# Patient Record
Sex: MALE | Race: WHITE | NOT HISPANIC OR LATINO | Employment: OTHER | URBAN - METROPOLITAN AREA
[De-identification: names, ages, dates, MRNs, and addresses within clinical notes are randomized per-mention and may not be internally consistent; named-entity substitution may affect disease eponyms.]

---

## 2021-03-10 ENCOUNTER — HOSPITAL ENCOUNTER (OUTPATIENT)
Dept: VACCINE CLINIC | Facility: HOSPITAL | Age: 61
Discharge: HOME | End: 2021-03-10
Attending: INTERNAL MEDICINE

## 2021-03-31 ENCOUNTER — HOSPITAL ENCOUNTER (OUTPATIENT)
Dept: VACCINE CLINIC | Facility: HOSPITAL | Age: 61
Discharge: HOME | End: 2021-03-31
Attending: INTERNAL MEDICINE

## 2023-04-12 NOTE — PROGRESS NOTES
"Chief Complaint: Elevated PSA    Subjective         History of Present Illness  Nasir Pekc is a 63 y.o. male presents to [facility] to be seen for elevated PSA.    Patient was seen by his PCP on 3/20/2023 for follow-up of an elevated PSA.  He had been treated with 21 days of Cipro.    Frequency- admits    Urgency- admits    Incontinence- post void dribble    Nocturia- 2    Stream- good    GH- denies     surgeries- denies    Family history of  malignancy- denies    Cardiopulmonary- denies    Anticoagulants- denies    Smoker- denies    PSA  3/21/2023 10.7  1/12/2023 9.05    Objective     Past Medical History:   Diagnosis Date   • Hypertension        History reviewed. No pertinent surgical history.      Current Outpatient Medications:   •  olmesartan (BENICAR) 5 MG tablet, Take 1 tablet by mouth Daily., Disp: , Rfl:   •  tamsulosin (FLOMAX) 0.4 MG capsule 24 hr capsule, Take 1 capsule by mouth Daily for 360 days., Disp: 90 capsule, Rfl: 3    Allergies   Allergen Reactions   • Codeine Unknown - Low Severity        No family history on file.    Social History     Socioeconomic History   • Marital status:    Tobacco Use   • Smoking status: Never     Passive exposure: Never   • Smokeless tobacco: Never   Vaping Use   • Vaping Use: Never used       Vital Signs:   Resp 16   Ht 188 cm (74\")   Wt 96.8 kg (213 lb 6.4 oz)   BMI 27.40 kg/m²      Physical Exam  Vitals reviewed.   Constitutional:       Appearance: Normal appearance.   Neurological:      General: No focal deficit present.      Mental Status: He is alert and oriented to person, place, and time.   Psychiatric:         Mood and Affect: Mood normal.         Behavior: Behavior normal.          Result Review :   The following data was reviewed by: RACHAEL Castaneda on 04/20/2023:  Results for orders placed or performed in visit on 04/20/23   Bladder Scan   Result Value Ref Range    Volume: 137         Bladder Scan " interpretation 04/20/2023    Estimation of residual urine via BVI 3000 [employer] Bladder Scan  MA/nurse performing: Dayanna AVELAR MA  Residual Urine: 137 ml  Indication: Elevated prostate specific antigen (PSA)    Benign prostatic hyperplasia with lower urinary tract symptoms, symptom details unspecified   Position: Supine  Examination: Incremental scanning of the suprapubic area using 2.0 MHz transducer using copious amounts of acoustic gel.   Findings: An anechoic area was demonstrated which represented the bladder, with measurement of residual urine as noted. I inspected this myself. In that the residual urine was  insignificant, refer to plan for treatment and plan necessary at this time.           Procedures        Assessment and Plan    Diagnoses and all orders for this visit:    1. Elevated prostate specific antigen (PSA) (Primary)  -     Bladder Scan  -     MRI Prostate With & Without Contrast; Future    2. Benign prostatic hyperplasia with lower urinary tract symptoms, symptom details unspecified  -     tamsulosin (FLOMAX) 0.4 MG capsule 24 hr capsule; Take 1 capsule by mouth Daily for 360 days.  Dispense: 90 capsule; Refill: 3    Elevated PSA-lab results discussed at length with patient.  Discussed several options with patient including a standard prostate biopsy; obtaining an MRI of prostate with possible subsequent fusion biopsy as results dictate.     Given that he was treated with antibiotics and his PSA elevated, we will go ahead with an MRI of the prostate.  We did discuss that if results dictate we will get him scheduled for a prostate biopsy.    BPH with Luts- behavioral modifications including fluid management, limiting fluids prior to sleep, and voiding immediately prior to sleep.         Continue conservative management of BPH Luts via behavioral modifications and medications; monitor for adverse outcomes of BPH which would warrant discussion of further management (ie hydronephrosis, recurrent  uti, bladder stones, urinary retention, or renal insufficiency)     Start Flomax, side effects discussed.  We will see him back after his MRI of the prostate to go over those results as well as to do a bladder scan to see if he is having improvement in urinary retention with starting the Flomax.          Follow Up   No follow-ups on file.  Patient was given instructions and counseling regarding his condition or for health maintenance advice. Please see specific information pulled into the AVS if appropriate.         This document has been electronically signed by RACHAEL Castaneda  April 20, 2023 08:28 EDT

## 2023-04-20 ENCOUNTER — OFFICE VISIT (OUTPATIENT)
Dept: UROLOGY | Facility: CLINIC | Age: 63
End: 2023-04-20
Payer: COMMERCIAL

## 2023-04-20 VITALS — RESPIRATION RATE: 16 BRPM | BODY MASS INDEX: 27.39 KG/M2 | WEIGHT: 213.4 LBS | HEIGHT: 74 IN

## 2023-04-20 DIAGNOSIS — R97.20 ELEVATED PROSTATE SPECIFIC ANTIGEN (PSA): Primary | ICD-10-CM

## 2023-04-20 DIAGNOSIS — N40.1 BENIGN PROSTATIC HYPERPLASIA WITH LOWER URINARY TRACT SYMPTOMS, SYMPTOM DETAILS UNSPECIFIED: ICD-10-CM

## 2023-04-20 LAB — SPECIMEN VOL SMN: 137 ML

## 2023-04-20 RX ORDER — OLMESARTAN MEDOXOMIL 5 MG/1
5 TABLET ORAL DAILY
COMMUNITY

## 2023-04-20 RX ORDER — TAMSULOSIN HYDROCHLORIDE 0.4 MG/1
1 CAPSULE ORAL DAILY
Qty: 90 CAPSULE | Refills: 3 | Status: SHIPPED | OUTPATIENT
Start: 2023-04-20 | End: 2024-04-14

## 2023-05-04 DIAGNOSIS — R97.20 ELEVATED PROSTATE SPECIFIC ANTIGEN (PSA): ICD-10-CM

## 2023-05-04 DIAGNOSIS — N41.1 CHRONIC PROSTATITIS: Primary | ICD-10-CM

## 2023-05-04 RX ORDER — DOXYCYCLINE HYCLATE 100 MG/1
100 CAPSULE ORAL 2 TIMES DAILY
Qty: 90 CAPSULE | Refills: 0 | Status: SHIPPED | OUTPATIENT
Start: 2023-05-04 | End: 2023-06-18

## 2023-05-11 ENCOUNTER — OFFICE VISIT (OUTPATIENT)
Dept: UROLOGY | Facility: CLINIC | Age: 63
End: 2023-05-11
Payer: COMMERCIAL

## 2023-05-11 VITALS — WEIGHT: 213.41 LBS | BODY MASS INDEX: 27.39 KG/M2 | RESPIRATION RATE: 16 BRPM | HEIGHT: 74 IN

## 2023-05-11 DIAGNOSIS — R97.20 ELEVATED PROSTATE SPECIFIC ANTIGEN (PSA): Primary | ICD-10-CM

## 2023-05-11 LAB — URINE VOLUME: 84

## 2023-05-11 NOTE — PROGRESS NOTES
Chief Complaint: Elevated PSA    Subjective         History of Present Illness  Nasir Peck is a 63 y.o. male presents to [facility] to be seen for BPH.    Patient was previously seen by me on 4/20/2023 with diagnosis of elevated PSA, BPH.  He was started on tamsulosin at that visit.  He will also had an MRI of his prostate which did show BPH as well as chronic prostatitis.  He was started on doxycycline.  At his previous visit he was also started on tamsulosin.  He is here for follow-up PVR.  His last PVR was 137 prior to starting tamsulosin.    Symptoms have started to improve. Getting up less at night and having less urgency. He is currently on tamsulosin and doxycycline.     MRI of the prostate with and without contrast dated May 4, 2023.     INDICATION: Elevated PSA. No previous biopsy.     TECHNIQUE: Multi parametric prostate protocol with high-resolution T1 and T2 weighted sequences, precontrast, diffusion weighted imaging and multiphase dynamic gadolinium enhanced gradient echo imaging during contrast administration. Post processing   includes construction of 3-D prostate volume and 3-D regions of interest for potential biopsy when applicable, performed on a separate workstation by the radiologist.     COMPARISON: None     FINDINGS: ADC and diffusion-weighted imaging is limited by susceptibility artifact from moderate to large amount of stool and air in the rectum.     Prostate gland is enlarged and measures 5.8 x 4.5 x 5.1 cm for total volume of 58.59 mL.     Heterogeneous signal intensity and enhancement in the enlarged central gland is related to BPH.     There is hazy and streaky decreased T2 signal intensity in the bilateral peripheral zones without significant restricted diffusion and favored to be related to chronic prostatitis. Midline posterior utricle cyst measures 1.4 cm.     Allowing for changes of prostatitis and susceptibility artifact, no suspicious T2  localizing lesion to suggest significant prostate malignancy.     Mildly distended bladder with mild prominence of the wall with obstruction from prostate hypertrophy not excluded. Seminal vesicles are symmetric.     No ascites or significant lymphadenopathy. Diverticulosis. Moderate to large amount of stool in the rectum. No suspicious osseous lesion.     IMPRESSION:   1. No suspicious T2 localizing lesion to suggest significant prostate malignancy.   2. Chronic prostatitis.   3. BPH.   4. Moderate to large amount of stool in the colon. Fecal impaction is not excluded.     Dictated by: Linnea Nunez M.D.     Images and Report reviewed and interpreted by: Linnea Nunez M.D.     <PS><Electronically signed by: Linnea Nunez M.D.>   05/04/2023 0946        Previous 4/20/2023:    Patient was seen by his PCP on 3/20/2023 for follow-up of an elevated PSA.  He had been treated with 21 days of Cipro.   Frequency- admits   Urgency- admits   Incontinence- post void dribble   Nocturia- 2   Stream- good   GH- denies    surgeries- denies   Family history of  malignancy- denies   Cardiopulmonary- denies   Anticoagulants- denies   Smoker- denies     PSA  3/21/2023 10.7  1/12/2023 9.05  Objective     Past Medical History:   Diagnosis Date   • Hypertension        History reviewed. No pertinent surgical history.      Current Outpatient Medications:   •  doxycycline (VIBRAMYCIN) 100 MG capsule, Take 1 capsule by mouth 2 (Two) Times a Day for 45 days., Disp: 90 capsule, Rfl: 0  •  olmesartan (BENICAR) 5 MG tablet, Take 1 tablet by mouth Daily., Disp: , Rfl:   •  tamsulosin (FLOMAX) 0.4 MG capsule 24 hr capsule, Take 1 capsule by mouth Daily for 360 days., Disp: 90 capsule, Rfl: 3    Allergies   Allergen Reactions   • Codeine Unknown - Low Severity        History reviewed. No pertinent family history.    Social History     Socioeconomic History   • Marital status:    Tobacco Use   • Smoking status: Never     Passive  "exposure: Never   • Smokeless tobacco: Never   Vaping Use   • Vaping Use: Never used       Vital Signs:   Resp 16   Ht 188 cm (74\")   Wt 96.8 kg (213 lb 6.5 oz)   BMI 27.40 kg/m²      Physical Exam  Vitals reviewed.   Constitutional:       Appearance: Normal appearance.   Neurological:      General: No focal deficit present.      Mental Status: He is alert and oriented to person, place, and time.   Psychiatric:         Mood and Affect: Mood normal.         Behavior: Behavior normal.          Result Review :   The following data was reviewed by: RACHAEL Castaneda on 05/11/2023:  Results for orders placed or performed in visit on 05/11/23   Bladder Scan   Result Value Ref Range    Urine Volume 84        Bladder Scan interpretation 05/11/2023    Estimation of residual urine via [employer] 3000 [employer] Bladder Scan  MA/nurse performing: Dayanna AVELAR MA  Residual Urine: 84 ml  Indication: Elevated prostate specific antigen (PSA)   Position: Supine  Examination: Incremental scanning of the suprapubic area using 2.0 MHz transducer using copious amounts of acoustic gel.   Findings: An anechoic area was demonstrated which represented the bladder, with measurement of residual urine as noted. I inspected this myself. In that the residual urine was  insignificant, refer to plan for treatment and plan necessary at this time.             Procedures        Assessment and Plan    Diagnoses and all orders for this visit:    1. Elevated prostate specific antigen (PSA) (Primary)  -     Bladder Scan    Given that his symptoms have began to improve we will continue him on the tamsulosin as well as have him complete the doxycycline.  We will see him back in 3 months with a PSA prior.      Follow Up   Return in about 3 months (around 8/11/2023) for with PSA prior.  Patient was given instructions and counseling regarding his condition or for health maintenance advice. Please see specific information pulled into the AVS if " appropriate.         This document has been electronically signed by Abigail Temple, RACHAEL  May 11, 2023 08:03 EDT

## 2023-08-04 ENCOUNTER — LAB (OUTPATIENT)
Dept: LAB | Facility: HOSPITAL | Age: 63
End: 2023-08-04
Payer: COMMERCIAL

## 2023-08-04 DIAGNOSIS — R97.20 ELEVATED PROSTATE SPECIFIC ANTIGEN (PSA): ICD-10-CM

## 2023-08-04 LAB — PSA SERPL-MCNC: 8.56 NG/ML (ref 0–4)

## 2023-08-04 PROCEDURE — 36415 COLL VENOUS BLD VENIPUNCTURE: CPT

## 2023-08-04 PROCEDURE — 84153 ASSAY OF PSA TOTAL: CPT

## 2023-08-09 NOTE — PROGRESS NOTES
Chief Complaint: Elevated PSA    Subjective         History of Present Illness  Nasir Pcek is a 63 y.o. male presents to [facility] to be seen for follow-up.    Patient was previously seen by me with last visit on 5/11/2023 for elevated PSA.  He did have an MRI of his prostate as well.  He was treated with a month-long course of doxycycline after his MRI.  He is here for follow-up. He reports he is not having any urinary symptoms since starting tamsulosin.     PSA  8/4/2023 8.56  PSAd 0.14    Previous 5/11/2023:  Patient was previously seen by me on 4/20/2023 with diagnosis of elevated PSA, BPH.  He was started on tamsulosin at that visit.  He will also had an MRI of his prostate which did show BPH as well as chronic prostatitis.  He was started on doxycycline.  At his previous visit he was also started on tamsulosin.  He is here for follow-up PVR.  His last PVR was 137 prior to starting tamsulosin.     Symptoms have started to improve. Getting up less at night and having less urgency. He is currently on tamsulosin and doxycycline.      MRI of the prostate with and without contrast dated May 4, 2023.     INDICATION: Elevated PSA. No previous biopsy.     TECHNIQUE: Multi parametric prostate protocol with high-resolution T1 and T2 weighted sequences, precontrast, diffusion weighted imaging and multiphase dynamic gadolinium enhanced gradient echo imaging during contrast administration. Post processing   includes construction of 3-D prostate volume and 3-D regions of interest for potential biopsy when applicable, performed on a separate workstation by the radiologist.     COMPARISON: None     FINDINGS: ADC and diffusion-weighted imaging is limited by susceptibility artifact from moderate to large amount of stool and air in the rectum.     Prostate gland is enlarged and measures 5.8 x 4.5 x 5.1 cm for total volume of 58.59 mL.     Heterogeneous signal intensity and enhancement in the  enlarged central gland is related to BPH.     There is hazy and streaky decreased T2 signal intensity in the bilateral peripheral zones without significant restricted diffusion and favored to be related to chronic prostatitis. Midline posterior utricle cyst measures 1.4 cm.     Allowing for changes of prostatitis and susceptibility artifact, no suspicious T2 localizing lesion to suggest significant prostate malignancy.     Mildly distended bladder with mild prominence of the wall with obstruction from prostate hypertrophy not excluded. Seminal vesicles are symmetric.     No ascites or significant lymphadenopathy. Diverticulosis. Moderate to large amount of stool in the rectum. No suspicious osseous lesion.     IMPRESSION:   1. No suspicious T2 localizing lesion to suggest significant prostate malignancy.   2. Chronic prostatitis.   3. BPH.   4. Moderate to large amount of stool in the colon. Fecal impaction is not excluded.     Dictated by: Linnea Nunez M.D.     Images and Report reviewed and interpreted by: Linnea Nunez M.D.     <PS><Electronically signed by: Linnea Nunez M.D.>   05/04/2023 0946          Previous 4/20/2023:     Patient was seen by his PCP on 3/20/2023 for follow-up of an elevated PSA.  He had been treated with 21 days of Cipro.   Frequency- admits   Urgency- admits   Incontinence- post void dribble   Nocturia- 2   Stream- good   GH- denies    surgeries- denies   Family history of  malignancy- denies   Cardiopulmonary- denies   Anticoagulants- denies   Smoker- denies     PSA  3/21/2023 10.7  1/12/2023 9.05       Objective     Past Medical History:   Diagnosis Date    Hypertension        Past Surgical History:   Procedure Laterality Date    INNER EAR SURGERY Right          Current Outpatient Medications:     olmesartan (BENICAR) 5 MG tablet, Take 1 tablet by mouth Daily., Disp: , Rfl:     tamsulosin (FLOMAX) 0.4 MG capsule 24 hr capsule, Take 1 capsule by mouth Daily for 360 days., Disp:  "90 capsule, Rfl: 3    Allergies   Allergen Reactions    Codeine Unknown - Low Severity        History reviewed. No pertinent family history.    Social History     Socioeconomic History    Marital status:    Tobacco Use    Smoking status: Never     Passive exposure: Never    Smokeless tobacco: Never   Vaping Use    Vaping Use: Never used       Vital Signs:   Resp 18   Ht 188 cm (74\")   Wt 96.8 kg (213 lb 6.5 oz)   BMI 27.40 kg/mý      Physical Exam  Vitals reviewed.   Constitutional:       Appearance: Normal appearance.   Neurological:      General: No focal deficit present.      Mental Status: He is alert and oriented to person, place, and time.   Psychiatric:         Mood and Affect: Mood normal.         Behavior: Behavior normal.        Result Review :   The following data was reviewed by: RACHAEL Castaneda on 08/11/2023:  Results for orders placed or performed in visit on 08/11/23   Bladder Scan   Result Value Ref Range    Urine Volume 39       PSA          8/4/2023    08:11   PSA   PSA 8.560      Bladder Scan interpretation 08/11/2023    Estimation of residual urine via BVI 3000 [employer] Bladder Scan  MA/nurse performing: Dayanna AVELAR MA  Residual Urine: 39 ml  Indication: Elevated prostate specific antigen (PSA)    Benign prostatic hyperplasia with lower urinary tract symptoms, symptom details unspecified   Position: Supine  Examination: Incremental scanning of the suprapubic area using 2.0 MHz transducer using copious amounts of acoustic gel.   Findings: An anechoic area was demonstrated which represented the bladder, with measurement of residual urine as noted. I inspected this myself. In that the residual urine was stable or insignificant, refer to plan for treatment and plan necessary at this time.           Procedures        Assessment and Plan    Diagnoses and all orders for this visit:    1. Elevated prostate specific antigen (PSA) (Primary)  -     Bladder Scan  -     PSA DIAGNOSTIC; " Future    2. Benign prostatic hyperplasia with lower urinary tract symptoms, symptom details unspecified    Given that his urinary symptoms have improved with tamsulosin he will continue on this medication.  We will plan to repeat his PSA in 6 months to follow this trend.  If his PSA continues to rise we will either schedule him for an MRI with biopsy as needed or we discussed that we could possibly to schedule him for a biopsy.  He will follow-up with me in 6 months or sooner for new concerns.    Follow Up   Return in about 6 months (around 2/11/2024) for with PSA prior with PVR.  Patient was given instructions and counseling regarding his condition or for health maintenance advice. Please see specific information pulled into the AVS if appropriate.         This document has been electronically signed by RACHAEL Castaneda  August 11, 2023 08:11 EDT

## 2023-08-11 ENCOUNTER — OFFICE VISIT (OUTPATIENT)
Dept: UROLOGY | Facility: CLINIC | Age: 63
End: 2023-08-11
Payer: COMMERCIAL

## 2023-08-11 VITALS — WEIGHT: 213.41 LBS | HEIGHT: 74 IN | RESPIRATION RATE: 18 BRPM | BODY MASS INDEX: 27.39 KG/M2

## 2023-08-11 DIAGNOSIS — N40.1 BENIGN PROSTATIC HYPERPLASIA WITH LOWER URINARY TRACT SYMPTOMS, SYMPTOM DETAILS UNSPECIFIED: ICD-10-CM

## 2023-08-11 DIAGNOSIS — R97.20 ELEVATED PROSTATE SPECIFIC ANTIGEN (PSA): Primary | ICD-10-CM

## 2023-08-11 LAB — URINE VOLUME: 39

## 2023-08-11 PROCEDURE — 99213 OFFICE O/P EST LOW 20 MIN: CPT | Performed by: NURSE PRACTITIONER

## 2024-02-12 ENCOUNTER — LAB (OUTPATIENT)
Dept: LAB | Facility: HOSPITAL | Age: 64
End: 2024-02-12
Payer: COMMERCIAL

## 2024-02-12 DIAGNOSIS — R97.20 ELEVATED PROSTATE SPECIFIC ANTIGEN (PSA): ICD-10-CM

## 2024-02-12 LAB — PSA SERPL-MCNC: 8.79 NG/ML (ref 0–4)

## 2024-02-12 PROCEDURE — 84153 ASSAY OF PSA TOTAL: CPT

## 2024-02-12 PROCEDURE — 36415 COLL VENOUS BLD VENIPUNCTURE: CPT

## 2024-02-19 ENCOUNTER — OFFICE VISIT (OUTPATIENT)
Dept: UROLOGY | Facility: CLINIC | Age: 64
End: 2024-02-19
Payer: COMMERCIAL

## 2024-02-19 VITALS
DIASTOLIC BLOOD PRESSURE: 82 MMHG | HEIGHT: 74 IN | SYSTOLIC BLOOD PRESSURE: 145 MMHG | HEART RATE: 76 BPM | BODY MASS INDEX: 27.39 KG/M2 | WEIGHT: 213.41 LBS

## 2024-02-19 DIAGNOSIS — R97.20 ELEVATED PROSTATE SPECIFIC ANTIGEN (PSA): Primary | ICD-10-CM

## 2024-02-19 LAB — URINE VOLUME: 33

## 2024-02-19 PROCEDURE — 99213 OFFICE O/P EST LOW 20 MIN: CPT | Performed by: NURSE PRACTITIONER

## 2024-02-19 PROCEDURE — 51798 US URINE CAPACITY MEASURE: CPT | Performed by: NURSE PRACTITIONER

## 2024-03-01 ENCOUNTER — TELEPHONE (OUTPATIENT)
Dept: SURGERY | Facility: CLINIC | Age: 64
End: 2024-03-01
Payer: COMMERCIAL

## 2024-03-01 DIAGNOSIS — R97.20 ELEVATED PROSTATE SPECIFIC ANTIGEN (PSA): Primary | ICD-10-CM

## 2024-03-01 NOTE — TELEPHONE ENCOUNTER
Patient called and stated he was returning Makenzie Manning phone call. I looked and did not see an encounter but patient states he believes it is in regards to test results.

## 2024-03-20 ENCOUNTER — TELEPHONE (OUTPATIENT)
Dept: UROLOGY | Facility: CLINIC | Age: 64
End: 2024-03-20
Payer: COMMERCIAL

## 2024-03-28 ENCOUNTER — TELEPHONE (OUTPATIENT)
Dept: UROLOGY | Facility: CLINIC | Age: 64
End: 2024-03-28
Payer: COMMERCIAL

## 2024-03-28 NOTE — TELEPHONE ENCOUNTER
EVELIN CALLED FROM [facility].  HE SAID THE PATIENT WAS NOT ON HIS SCHEDULE TODAY, BUT HE SHOWED UP THERE FOR AN MRI PROSTATE.    IT HAD BEEN RESCHEDULED TO [facility].  HE ASKED IF THE ONE AT [facility] ON 04/17/24 HAS BEEN AUTHORIZED.    PER KAELYN, I TOLD EVELIN THAT THE PATIENT'S INSURANCE WILL NOT COVER HIM AT [place] IN [facility].      I TOLD EVELIN THAT DEJAN SENT THE PATIENT A MESSAGE THROUGH MY CHART THAT HE HAS NOT READ, REGARDING THE RESCHEDULE.    I TOLD EVELIN THE PATIENT WILL NEED TO CALL THE OFFICE.    EVELIN #[phone]

## 2024-04-02 ENCOUNTER — TELEPHONE (OUTPATIENT)
Dept: UROLOGY | Facility: CLINIC | Age: 64
End: 2024-04-02
Payer: MEDICARE

## 2024-04-02 NOTE — TELEPHONE ENCOUNTER
Called pt and left him a VM asking him to call me between 8-4 tomorrow to let me know if his new insurance has kicked in and to give me the information so that I know which location to schedule his MRI depending on which insurance is primary. Asked him to ask for me so that I could get his new insurance information.

## 2024-04-03 ENCOUNTER — TELEPHONE (OUTPATIENT)
Dept: UROLOGY | Facility: CLINIC | Age: 64
End: 2024-04-03
Payer: MEDICARE

## 2024-04-03 NOTE — TELEPHONE ENCOUNTER
Pt called back and let me know that his Medicare has taken affect and will be primary, but his secondary will be [place] [employer] and he hasn't gotten his card yet so that we can update the information and make his Medicare primary. Once he gets his card he will call us and give us the information and once we update his chart then I can make the MRI appt in [place], but I have to have the updated demographics sheet to fax to them showing medicare is primary before I can do that. Pt verbalized understanding and said once he gets his card he will call us back.

## 2024-04-17 DIAGNOSIS — N40.1 BENIGN PROSTATIC HYPERPLASIA WITH LOWER URINARY TRACT SYMPTOMS, SYMPTOM DETAILS UNSPECIFIED: ICD-10-CM

## 2024-04-17 RX ORDER — TAMSULOSIN HYDROCHLORIDE 0.4 MG/1
1 CAPSULE ORAL DAILY
Qty: 30 CAPSULE | Refills: 0 | Status: SHIPPED | OUTPATIENT
Start: 2024-04-17 | End: 2024-04-19 | Stop reason: SDUPTHER

## 2024-04-19 DIAGNOSIS — N40.1 BENIGN PROSTATIC HYPERPLASIA WITH LOWER URINARY TRACT SYMPTOMS, SYMPTOM DETAILS UNSPECIFIED: ICD-10-CM

## 2024-04-19 RX ORDER — TAMSULOSIN HYDROCHLORIDE 0.4 MG/1
1 CAPSULE ORAL DAILY
Qty: 30 CAPSULE | Refills: 0 | Status: SHIPPED | OUTPATIENT
Start: 2024-04-19 | End: 2024-04-19

## 2024-04-19 RX ORDER — TAMSULOSIN HYDROCHLORIDE 0.4 MG/1
1 CAPSULE ORAL DAILY
Qty: 90 CAPSULE | Refills: 0 | Status: SHIPPED | OUTPATIENT
Start: 2024-04-19 | End: 2024-07-18

## 2024-04-23 NOTE — PROGRESS NOTES
Chief Complaint: Elevated PSA    Subjective         History of Present Illness  Nasir Peck is a 64 y.o. male presents to [facility] to be seen for follow-up.    Patient was previously seen by me last visit on 2/19/2024 for elevated PSA.  At that visit we did discuss doing the exosome urine test.  His exosome did show a score of 22.24.  So he then agreed to repeat his MRI of the prostate.  He is here for follow-up.      EXAM: MRI PROSTATE WITH AND WITHOUT CONTRAST     DATE OF EXAM: 04/18/2024     CLINICAL HISTORY: Dx: Elevated PSA [R97.20 (ICD-10-CM)]     COMPARISON: May 4, 2023     TECHNIQUE: MR imaging of the prostate gland was performed prior to and   following administration of intravenous gadolinium. The raw data was   processed for calculation of prostate volumes using 3 dimensional   reconstructions on an independent workstation.     FINDINGS:   Prostate size: 5.6 x 4.6 x 5.1 cm. Prostate volume: 58.75 cc   PSA density: 0.15     The prostate transition zone is noted to be enlarged with benign   prostatic hyperplasia.   There is median lobe hypertrophy indenting upon the base of the   urinary bladder.   Regions subtle hazy and streaky appearing decreased T2 signal   intensity in the bilateral peripheral zone of the prostate gland are   without significant restricted diffusion component and thought likely   related to chronic prostatitis.   The appearance is similar to comparison.   A midline posterior utricle cyst measures approximately 1.4 cm;   stable.     The prostate was assessed using the PI-RADS 2 scoring system.     Allowing for changes of prostatitis, no suspicious T2 localizing   lesion to suggest significant prostate malignancy.     The following lesions are of at least intermediate suspicion (PI-RADS   3 or greater):     Normal appearance of the seminal vesicles.   Normal rectal prosthetic aortic angle.   No pelvic adenopathy. No ascites.   No suspicious bone lesion.      IMPRESSION:   1. No suspicious T2 localizing lesion to suggest significant prostate   malignancy.   2. Sequela of chronic prostatitis with findings of BPH.         Dictated by: Britton Ceja M.D.     Previous 2/19/2024:  Patient was previously seen by me with last visit on 8/11/2023 for elevated PSA.  He is here for follow-up.        He is doing well with tamsulosin.         PSA  2/12/2024 8.79, PSAd 0.15     Previous 8/11/2023:  Patient was previously seen by me with last visit on 5/11/2023 for elevated PSA.  He did have an MRI of his prostate as well.  He was treated with a month-long course of doxycycline after his MRI.  He is here for follow-up. He reports he is not having any urinary symptoms since starting tamsulosin.      PSA  8/4/2023 8.56  PSAd   0.14     Previous 5/11/2023:  Patient was previously seen by me on 4/20/2023 with diagnosis of elevated PSA, BPH.  He was started on tamsulosin at that visit.  He will also had an MRI of his prostate which did show BPH as well as chronic prostatitis.  He was started on doxycycline.  At his previous visit he was also started on tamsulosin.  He is here for follow-up PVR.  His last PVR was 137 prior to starting tamsulosin.     Symptoms have started to improve. Getting up less at night and having less urgency. He is currently on tamsulosin and doxycycline.      MRI of the prostate with and without contrast dated May 4, 2023.     INDICATION: Elevated PSA. No previous biopsy.     TECHNIQUE: Multi parametric prostate protocol with high-resolution T1 and T2 weighted sequences, precontrast, diffusion weighted imaging and multiphase dynamic gadolinium enhanced gradient echo imaging during contrast administration. Post processing   includes construction of 3-D prostate volume and 3-D regions of interest for potential biopsy when applicable, performed on a separate workstation by the radiologist.     COMPARISON: None     FINDINGS: ADC and diffusion-weighted imaging  is limited by susceptibility artifact from moderate to large amount of stool and air in the rectum.     Prostate gland is enlarged and measures 5.8 x 4.5 x 5.1 cm for total volume of 58.59 mL.     Heterogeneous signal intensity and enhancement in the enlarged central gland is related to BPH.     There is hazy and streaky decreased T2 signal intensity in the bilateral peripheral zones without significant restricted diffusion and favored to be related to chronic prostatitis. Midline posterior utricle cyst measures 1.4 cm.     Allowing for changes of prostatitis and susceptibility artifact, no suspicious T2 localizing lesion to suggest significant prostate malignancy.     Mildly distended bladder with mild prominence of the wall with obstruction from prostate hypertrophy not excluded. Seminal vesicles are symmetric.     No ascites or significant lymphadenopathy. Diverticulosis. Moderate to large amount of stool in the rectum. No suspicious osseous lesion.     IMPRESSION:   1. No suspicious T2 localizing lesion to suggest significant prostate malignancy.   2. Chronic prostatitis.   3. BPH.   4. Moderate to large amount of stool in the colon. Fecal impaction is not excluded.     Dictated by: Linnea Nunez M.D.     Images and Report reviewed and interpreted by: Linnea Nunez M.D.     <PS><Electronically signed by: Linnea Nunez M.D.>   05/04/2023 0946          Previous 4/20/2023:     Patient was seen by his PCP on 3/20/2023 for follow-up of an elevated PSA.  He had been treated with 21 days of Cipro.   Frequency- admits   Urgency- admits   Incontinence- post void dribble   Nocturia- 2   Stream- good   GH- denies    surgeries- denies   Family history of  malignancy- denies   Cardiopulmonary- denies   Anticoagulants- denies   Smoker- denies     PSA  3/21/2023 10.7  1/12/2023 9.05       Objective     Past Medical History:   Diagnosis Date    Hypertension        Past Surgical History:   Procedure Laterality Date     "INNER EAR SURGERY Right          Current Outpatient Medications:     olmesartan (BENICAR) 5 MG tablet, Take 1 tablet by mouth Daily., Disp: , Rfl:     tamsulosin (FLOMAX) 0.4 MG capsule 24 hr capsule, Take 1 capsule by mouth Daily for 90 days., Disp: 90 capsule, Rfl: 0    Allergies   Allergen Reactions    Codeine Unknown - Low Severity        No family history on file.    Social History     Socioeconomic History    Marital status:    Tobacco Use    Smoking status: Never     Passive exposure: Never    Smokeless tobacco: Never   Vaping Use    Vaping status: Never Used       Vital Signs:   /89 (BP Location: Left arm, Patient Position: Sitting, Cuff Size: Large Adult)   Pulse 66   Ht 188 cm (74\")   Wt 96.8 kg (213 lb 6.5 oz)   BMI 27.40 kg/m²      Physical Exam  Vitals reviewed.   Constitutional:       Appearance: Normal appearance.   Neurological:      General: No focal deficit present.      Mental Status: He is alert and oriented to person, place, and time.   Psychiatric:         Mood and Affect: Mood normal.         Behavior: Behavior normal.          Result Review :   The following data was reviewed by: RACHAEL Castaneda on 04/24/2024:  Results for orders placed or performed in visit on 04/24/24   Bladder Scan   Result Value Ref Range    Urine Volume 1       PSA          8/4/2023    08:11 2/12/2024    08:20   PSA   PSA 8.560  8.790        Bladder Scan interpretation 04/24/2024    Estimation of residual urine via BVI 3000 [address] Bladder Scan  MA/nurse performing: Dayanna AVELAR MA  Residual Urine: 1 ml  Indication: Elevated prostate specific antigen (PSA)   Position: Supine  Examination: Incremental scanning of the suprapubic area using 2.0 MHz transducer using copious amounts of acoustic gel.   Findings: An anechoic area was demonstrated which represented the bladder, with measurement of residual urine as noted. I inspected this myself. In that the residual urine was stable or " insignificant, refer to plan for treatment and plan necessary at this time.         Procedures        Assessment and Plan    Diagnoses and all orders for this visit:    1. Elevated prostate specific antigen (PSA) (Primary)  -     Bladder Scan  -     PSA DIAGNOSTIC; Future    Extensive discussion had with patient regarding results of MRI and exosome urine test as well as risk and benefits of biopsy.  He does understand the 22 on His Exosome Test Puts Him in the  10 to 30% Risk of Prostate Cancer.  After discussion he he reports that he and his wife are very comfortable with waiting another 6 months with a repeat PSA since his PSA did go from 10 down to 8.  If his PSA remains stable he may want to wait another 6 months after this to repeat PSA and repeat exosome test.  He does report that he is can go home and discuss everything with his wife and if he changes his mind and decides that he wants to go ahead with a biopsy that we will get that scheduled for him.    At this point I will plan to see him back in 6 months with PSA prior.    Follow Up   No follow-ups on file.  Patient was given instructions and counseling regarding his condition or for health maintenance advice. Please see specific information pulled into the AVS if appropriate.         This document has been electronically signed by RACHAEL Castaneda  April 24, 2024 08:13 EDT

## 2024-04-24 ENCOUNTER — OFFICE VISIT (OUTPATIENT)
Dept: UROLOGY | Facility: CLINIC | Age: 64
End: 2024-04-24
Payer: MEDICARE

## 2024-04-24 VITALS
BODY MASS INDEX: 27.39 KG/M2 | SYSTOLIC BLOOD PRESSURE: 148 MMHG | HEIGHT: 74 IN | DIASTOLIC BLOOD PRESSURE: 89 MMHG | WEIGHT: 213.41 LBS | HEART RATE: 66 BPM

## 2024-04-24 DIAGNOSIS — R97.20 ELEVATED PROSTATE SPECIFIC ANTIGEN (PSA): Primary | ICD-10-CM

## 2024-04-24 LAB — URINE VOLUME: 1

## 2024-09-13 DIAGNOSIS — N40.1 BENIGN PROSTATIC HYPERPLASIA WITH LOWER URINARY TRACT SYMPTOMS, SYMPTOM DETAILS UNSPECIFIED: ICD-10-CM

## 2024-09-13 RX ORDER — TAMSULOSIN HYDROCHLORIDE 0.4 MG/1
1 CAPSULE ORAL DAILY
Qty: 30 CAPSULE | Refills: 0 | OUTPATIENT
Start: 2024-09-13

## 2024-10-17 ENCOUNTER — TELEPHONE (OUTPATIENT)
Dept: UROLOGY | Facility: CLINIC | Age: 64
End: 2024-10-17
Payer: MEDICARE

## 2024-10-17 DIAGNOSIS — N40.1 BENIGN PROSTATIC HYPERPLASIA WITH LOWER URINARY TRACT SYMPTOMS, SYMPTOM DETAILS UNSPECIFIED: Primary | ICD-10-CM

## 2024-10-17 RX ORDER — TAMSULOSIN HYDROCHLORIDE 0.4 MG/1
1 CAPSULE ORAL DAILY
Qty: 90 CAPSULE | Refills: 0 | Status: SHIPPED | OUTPATIENT
Start: 2024-10-17

## 2024-10-17 NOTE — TELEPHONE ENCOUNTER
PATIENT'S WIFE CALLED STATING HE ONLY HAS 2 PILLS LEFT OF THE TAMSULOSIN AND WANTS TO KNOW IF YOU COULD SEND IN ENOUGH TO LAST UNTIL HIS APPOINTMENT WITH YOU ON 10/28?

## 2024-10-17 NOTE — TELEPHONE ENCOUNTER
Please advise patient that I have sent in a 90-day supply.  I will give further refills when he comes in for his visit.

## 2024-10-21 ENCOUNTER — LAB (OUTPATIENT)
Dept: LAB | Facility: HOSPITAL | Age: 64
End: 2024-10-21
Payer: MEDICARE

## 2024-10-21 DIAGNOSIS — R97.20 ELEVATED PROSTATE SPECIFIC ANTIGEN (PSA): ICD-10-CM

## 2024-10-21 LAB — PSA SERPL-MCNC: 10.6 NG/ML (ref 0–4)

## 2024-10-21 PROCEDURE — 36415 COLL VENOUS BLD VENIPUNCTURE: CPT

## 2024-10-21 PROCEDURE — 84153 ASSAY OF PSA TOTAL: CPT

## 2024-10-23 DIAGNOSIS — R97.20 ELEVATED PROSTATE SPECIFIC ANTIGEN (PSA): Primary | ICD-10-CM

## 2024-12-10 NOTE — PROGRESS NOTES
Chief Complaint: Elevated PSA    Subjective         History of Present Illness  Nasir Peck is a 64 y.o. male presents to [facility] to be seen for follow-up.    Patient was previously seen by me with last visit on 4/24/2024 for elevated PSA.  At that visit he is here today to go over results of MRI.      He reports tamsulosin is helpful.       PSA  10/21/2024 10.6    DATE OF EXAM: 12/04/2024     CLINICAL HISTORY: ELEVATED PSA. Patient has had 2 prior prostate MRI   and no biopsies.     COMPARISON: Prostate MR 4/22/24       FINDINGS:   Prostate volume: 61 cc   PSA: 10.6 ng/mL.   PSA density: 0.17 ng/mL^2.     Peripheral zone: There is a midline posterior prostatic utricle cyst,   unchanged. There are wedge-shaped peripheral zone T2 hypointensities   in keeping with sequela prostatitis. Diffusion-weighted images is   mildly limited due to rectal gas susceptibility artifact, within these   limitations no high-grade focal diffusion restriction is seen to   suggest a peripheral zone lesion.     Transition zone: There are changes related to benign prostatic   hypertrophy. The gland protrudes superiorly into the bladder base.     Central zone: Normal.     Extracapsular extension: There is no evidence of extracapsular   extension.     The seminal vesicles and neurovascular bundles are normal. No pelvic   lymphadenopathy.       The prostate was assessed using the PI-RADS 2 scoring system.     There are no suspicious lesions in the prostate (PI-RADS 3 or greater)     Other findings: Urinary bladder is thick-walled and trabeculated in   keeping with chronic bladder outlet obstruction. There is a small   fat-containing left internal hernia. There is colonic diverticulosis   without diverticulitis.     IMPRESSION:     1. No MR evidence for clinically significant prostate malignancy.   2. Sequela prostatitis and benign prostatic hyperplasia.           Dictated by: Roc Fish M.D.     Previous  4/24/2024:  Patient was previously seen by me last visit on 2/19/2024 for elevated PSA.  At that visit we did discuss doing the exosome urine test.  His exosome did show a score of 22.24.  So he then agreed to repeat his MRI of the prostate.  He is here for follow-up.        EXAM: MRI PROSTATE WITH AND WITHOUT CONTRAST     DATE OF EXAM: 04/18/2024     CLINICAL HISTORY: Dx: Elevated PSA [R97.20 (ICD-10-CM)]     COMPARISON: May 4, 2023       FINDINGS:   Prostate size: 5.6 x 4.6 x 5.1 cm. Prostate volume: 58.75 cc   PSA density: 0.15     The prostate transition zone is noted to be enlarged with benign   prostatic hyperplasia.   There is median lobe hypertrophy indenting upon the base of the   urinary bladder.   Regions subtle hazy and streaky appearing decreased T2 signal   intensity in the bilateral peripheral zone of the prostate gland are   without significant restricted diffusion component and thought likely   related to chronic prostatitis.   The appearance is similar to comparison.   A midline posterior utricle cyst measures approximately 1.4 cm;   stable.     The prostate was assessed using the PI-RADS 2 scoring system.     Allowing for changes of prostatitis, no suspicious T2 localizing   lesion to suggest significant prostate malignancy.     The following lesions are of at least intermediate suspicion (PI-RADS   3 or greater):     Normal appearance of the seminal vesicles.   Normal rectal prosthetic aortic angle.   No pelvic adenopathy. No ascites.   No suspicious bone lesion.     IMPRESSION:   1. No suspicious T2 localizing lesion to suggest significant prostate   malignancy.   2. Sequela of chronic prostatitis with findings of BPH.         Dictated by: Britton Ceja M.D.      Previous 2/19/2024:  Patient was previously seen by me with last visit on 8/11/2023 for elevated PSA.  He is here for follow-up.        He is doing well with tamsulosin.         PSA  2/12/2024 8.79, PSAd 0.15     Previous  8/11/2023:  Patient was previously seen by me with last visit on 5/11/2023 for elevated PSA.  He did have an MRI of his prostate as well.  He was treated with a month-long course of doxycycline after his MRI.  He is here for follow-up. He reports he is not having any urinary symptoms since starting tamsulosin.      PSA  8/4/2023 8.56  PSAd   0.14     Previous 5/11/2023:  Patient was previously seen by me on 4/20/2023 with diagnosis of elevated PSA, BPH.  He was started on tamsulosin at that visit.  He will also had an MRI of his prostate which did show BPH as well as chronic prostatitis.  He was started on doxycycline.  At his previous visit he was also started on tamsulosin.  He is here for follow-up PVR.  His last PVR was 137 prior to starting tamsulosin.     Symptoms have started to improve. Getting up less at night and having less urgency. He is currently on tamsulosin and doxycycline.      MRI of the prostate with and without contrast dated May 4, 2023.     INDICATION: Elevated PSA. No previous biopsy.     TECHNIQUE: Multi parametric prostate protocol with high-resolution T1 and T2 weighted sequences, precontrast, diffusion weighted imaging and multiphase dynamic gadolinium enhanced gradient echo imaging during contrast administration. Post processing   includes construction of 3-D prostate volume and 3-D regions of interest for potential biopsy when applicable, performed on a separate workstation by the radiologist.     COMPARISON: None     FINDINGS: ADC and diffusion-weighted imaging is limited by susceptibility artifact from moderate to large amount of stool and air in the rectum.     Prostate gland is enlarged and measures 5.8 x 4.5 x 5.1 cm for total volume of 58.59 mL.     Heterogeneous signal intensity and enhancement in the enlarged central gland is related to BPH.     There is hazy and streaky decreased T2 signal intensity in the bilateral peripheral zones without significant restricted diffusion and  favored to be related to chronic prostatitis. Midline posterior utricle cyst measures 1.4 cm.     Allowing for changes of prostatitis and susceptibility artifact, no suspicious T2 localizing lesion to suggest significant prostate malignancy.     Mildly distended bladder with mild prominence of the wall with obstruction from prostate hypertrophy not excluded. Seminal vesicles are symmetric.     No ascites or significant lymphadenopathy. Diverticulosis. Moderate to large amount of stool in the rectum. No suspicious osseous lesion.     IMPRESSION:   1. No suspicious T2 localizing lesion to suggest significant prostate malignancy.   2. Chronic prostatitis.   3. BPH.   4. Moderate to large amount of stool in the colon. Fecal impaction is not excluded.     Dictated by: Linnea Nunez M.D.     Images and Report reviewed and interpreted by: Linnea Nunez M.D.     <PS><Electronically signed by: Linnea Nunez M.D.>   05/04/2023 0946          Previous 4/20/2023:     Patient was seen by his PCP on 3/20/2023 for follow-up of an elevated PSA.  He had been treated with 21 days of Cipro.   Frequency- admits   Urgency- admits   Incontinence- post void dribble   Nocturia- 2   Stream- good   GH- denies    surgeries- denies   Family history of  malignancy- denies   Cardiopulmonary- denies   Anticoagulants- denies   Smoker- denies     PSA  3/21/2023 10.7  1/12/2023 9.05          Objective     Past Medical History:   Diagnosis Date    Hypertension        Past Surgical History:   Procedure Laterality Date    INNER EAR SURGERY Right          Current Outpatient Medications:     olmesartan (BENICAR) 5 MG tablet, Take 1 tablet by mouth Daily., Disp: , Rfl:     tamsulosin (FLOMAX) 0.4 MG capsule 24 hr capsule, Take 1 capsule by mouth Daily., Disp: 90 capsule, Rfl: 0    doxycycline (VIBRAMYCIN) 100 MG capsule, Take 1 capsule by mouth 2 (Two) Times a Day for 28 days., Disp: 56 capsule, Rfl: 0    Lactobacillus (Florajen Acidophilus)  "capsule, Take 1 capsule by mouth Daily., Disp: 28 capsule, Rfl: 0    Allergies   Allergen Reactions    Codeine Unknown - Low Severity        History reviewed. No pertinent family history.    Social History     Socioeconomic History    Marital status:    Tobacco Use    Smoking status: Never     Passive exposure: Never    Smokeless tobacco: Never   Vaping Use    Vaping status: Never Used       Vital Signs:   Resp 14   Ht 188 cm (74\")   Wt 96.8 kg (213 lb 6.5 oz)   BMI 27.40 kg/m²      Physical Exam  Vitals reviewed.   Constitutional:       Appearance: Normal appearance.   Neurological:      General: No focal deficit present.      Mental Status: He is alert and oriented to person, place, and time.   Psychiatric:         Mood and Affect: Mood normal.         Behavior: Behavior normal.          Result Review :   The following data was reviewed by: RACHAEL Castaneda on 12/12/2024:  Results for orders placed or performed in visit on 10/21/24   PSA DIAGNOSTIC    Collection Time: 10/21/24  8:15 AM    Specimen: Blood   Result Value Ref Range    PSA 10.600 (H) 0.000 - 4.000 ng/mL      PSA          2/12/2024    08:20 10/21/2024    08:15   PSA   PSA 8.790  10.600          Procedures        Assessment and Plan    Diagnoses and all orders for this visit:    1. Elevated prostate specific antigen (PSA) (Primary)  -     doxycycline (VIBRAMYCIN) 100 MG capsule; Take 1 capsule by mouth 2 (Two) Times a Day for 28 days.  Dispense: 56 capsule; Refill: 0  -     Lactobacillus (Florajen Acidophilus) capsule; Take 1 capsule by mouth Daily.  Dispense: 28 capsule; Refill: 0  -     PSA DIAGNOSTIC; Future    2. Benign prostatic hyperplasia with lower urinary tract symptoms, symptom details unspecified    Given that his PSA density is elevated, we discussed multiple options.  We did discuss that we could go ahead and do a biopsy since his PSA density is high and his exosome was above 20, or we could do another antibiotic and " see if the density came back daily.  After our discussion he would like to do the antibiotics and then repeat the PSA in 3 months.    He is doing well on tamsulosin so we will continue with that medication.    I will see him back in 3 months or sooner for new concerns.    Follow Up   Return in about 3 months (around 3/12/2025) for with PSA prior.  Patient was given instructions and counseling regarding his condition or for health maintenance advice. Please see specific information pulled into the AVS if appropriate.         This document has been electronically signed by RACHAEL Castaneda  December 12, 2024 08:43 EST

## 2024-12-12 ENCOUNTER — OFFICE VISIT (OUTPATIENT)
Dept: UROLOGY | Age: 64
End: 2024-12-12
Payer: MEDICARE

## 2024-12-12 VITALS — HEIGHT: 74 IN | RESPIRATION RATE: 14 BRPM | BODY MASS INDEX: 27.39 KG/M2 | WEIGHT: 213.41 LBS

## 2024-12-12 DIAGNOSIS — N40.1 BENIGN PROSTATIC HYPERPLASIA WITH LOWER URINARY TRACT SYMPTOMS, SYMPTOM DETAILS UNSPECIFIED: ICD-10-CM

## 2024-12-12 DIAGNOSIS — R97.20 ELEVATED PROSTATE SPECIFIC ANTIGEN (PSA): Primary | ICD-10-CM

## 2024-12-12 RX ORDER — LACTOBACILLUS ACIDOPHILUS 20B CELL
1 CAPSULE ORAL DAILY
Qty: 28 CAPSULE | Refills: 0 | Status: SHIPPED | OUTPATIENT
Start: 2024-12-12

## 2024-12-12 RX ORDER — DOXYCYCLINE 100 MG/1
100 CAPSULE ORAL 2 TIMES DAILY
Qty: 56 CAPSULE | Refills: 0 | Status: SHIPPED | OUTPATIENT
Start: 2024-12-12 | End: 2025-01-09

## 2024-12-27 ENCOUNTER — TRANSCRIBE ORDERS (OUTPATIENT)
Dept: ADMINISTRATIVE | Facility: HOSPITAL | Age: 64
End: 2024-12-27
Payer: MEDICARE

## 2024-12-27 DIAGNOSIS — R20.2 PARESTHESIA: Primary | ICD-10-CM

## 2025-01-11 DIAGNOSIS — N40.1 BENIGN PROSTATIC HYPERPLASIA WITH LOWER URINARY TRACT SYMPTOMS, SYMPTOM DETAILS UNSPECIFIED: ICD-10-CM

## 2025-01-13 RX ORDER — TAMSULOSIN HYDROCHLORIDE 0.4 MG/1
1 CAPSULE ORAL DAILY
Qty: 90 CAPSULE | Refills: 4 | Status: SHIPPED | OUTPATIENT
Start: 2025-01-13

## 2025-01-15 ENCOUNTER — HOSPITAL ENCOUNTER (OUTPATIENT)
Facility: HOSPITAL | Age: 65
Discharge: HOME | End: 2025-01-15
Admitting: NURSE PRACTITIONER
Payer: MEDICARE

## 2025-01-15 DIAGNOSIS — R20.2 PARESTHESIA: ICD-10-CM

## 2025-01-15 PROCEDURE — 95911 NRV CNDJ TEST 9-10 STUDIES: CPT

## 2025-01-15 PROCEDURE — 95886 MUSC TEST DONE W/N TEST COMP: CPT

## 2025-03-04 ENCOUNTER — LAB (OUTPATIENT)
Dept: LAB | Facility: HOSPITAL | Age: 65
End: 2025-03-04
Payer: MEDICARE

## 2025-03-04 DIAGNOSIS — R97.20 ELEVATED PROSTATE SPECIFIC ANTIGEN (PSA): ICD-10-CM

## 2025-03-04 LAB — PSA SERPL-MCNC: 13.9 NG/ML (ref 0–4)

## 2025-03-04 PROCEDURE — 84153 ASSAY OF PSA TOTAL: CPT

## 2025-03-04 PROCEDURE — 36415 COLL VENOUS BLD VENIPUNCTURE: CPT

## 2025-03-11 NOTE — PROGRESS NOTES
Chief Complaint: Elevated PSA    Subjective         History of Present Illness  Nasir Peck is a 64 y.o. male presents to [facility] to be seen for follow-up.    Patient was previously seen by me with last visit on 12/12/2024 for elevated PSA.  At that visit he was treated with a month-long course of antibiotics.  We did discuss that we can do a random biopsy since his PSA density was high and his exosome was above 20.  However he wanted to do the antibiotics and then repeat the PSA in 3 months.  He is here for follow-up.    History of Present Illness  The patient is a 64-year-old male who presents for evaluation of elevated PSA.    He reports an increase in his prostate-specific antigen (PSA) levels. He has been experiencing frequent urination, which he manages with Flomax. He reports satisfactory urination but expresses concern over the urgency of his urinary urges. He is not currently on any anticoagulant therapy or injectable medications for diabetes or weight management. He has previously undergone a colonoscopy under anesthesia without any complications. He has expressed a preference for Dr. Burgess to perform the biopsy, as recommended by a colleague. He is scheduled to be on vacation from 05/03/2025 through the following week.    He has been taking vitamin B12 supplements since December 2024 due to neuropathic symptoms in his feet, which are more pronounced at night. He was prescribed analgesics for this condition but has chosen not to take them.        PSA  3/4/2025 13.9, PSAd 0.23    Previous 12/12/2024:  Patient was previously seen by me with last visit on 4/24/2024 for elevated PSA.  At that visit he is here today to go over results of MRI.        He reports tamsulosin is helpful.         PSA  10/21/2024 10.6     DATE OF EXAM: 12/04/2024     CLINICAL HISTORY: ELEVATED PSA. Patient has had 2 prior prostate MRI   and no biopsies.     COMPARISON: Prostate MR 4/22/24        FINDINGS:   Prostate volume: 61 cc   PSA: 10.6 ng/mL.   PSA density: 0.17 ng/mL^2.     Peripheral zone: There is a midline posterior prostatic utricle cyst,   unchanged. There are wedge-shaped peripheral zone T2 hypointensities   in keeping with sequela prostatitis. Diffusion-weighted images is   mildly limited due to rectal gas susceptibility artifact, within these   limitations no high-grade focal diffusion restriction is seen to   suggest a peripheral zone lesion.     Transition zone: There are changes related to benign prostatic   hypertrophy. The gland protrudes superiorly into the bladder base.     Central zone: Normal.     Extracapsular extension: There is no evidence of extracapsular   extension.     The seminal vesicles and neurovascular bundles are normal. No pelvic   lymphadenopathy.       The prostate was assessed using the PI-RADS 2 scoring system.     There are no suspicious lesions in the prostate (PI-RADS 3 or greater)     Other findings: Urinary bladder is thick-walled and trabeculated in   keeping with chronic bladder outlet obstruction. There is a small   fat-containing left internal hernia. There is colonic diverticulosis   without diverticulitis.     IMPRESSION:     1. No MR evidence for clinically significant prostate malignancy.   2. Sequela prostatitis and benign prostatic hyperplasia.           Dictated by: Roc Fish M.D.      Previous 4/24/2024:  Patient was previously seen by me last visit on 2/19/2024 for elevated PSA.  At that visit we did discuss doing the exosome urine test.  His exosome did show a score of 22.24.  So he then agreed to repeat his MRI of the prostate.  He is here for follow-up.        EXAM: MRI PROSTATE WITH AND WITHOUT CONTRAST     DATE OF EXAM: 04/18/2024     CLINICAL HISTORY: Dx: Elevated PSA [R97.20 (ICD-10-CM)]     COMPARISON: May 4, 2023       FINDINGS:   Prostate size: 5.6 x 4.6 x 5.1 cm. Prostate volume: 58.75 cc   PSA density: 0.15     The prostate  transition zone is noted to be enlarged with benign   prostatic hyperplasia.   There is median lobe hypertrophy indenting upon the base of the   urinary bladder.   Regions subtle hazy and streaky appearing decreased T2 signal   intensity in the bilateral peripheral zone of the prostate gland are   without significant restricted diffusion component and thought likely   related to chronic prostatitis.   The appearance is similar to comparison.   A midline posterior utricle cyst measures approximately 1.4 cm;   stable.     The prostate was assessed using the PI-RADS 2 scoring system.     Allowing for changes of prostatitis, no suspicious T2 localizing   lesion to suggest significant prostate malignancy.     The following lesions are of at least intermediate suspicion (PI-RADS   3 or greater):     Normal appearance of the seminal vesicles.   Normal rectal prosthetic aortic angle.   No pelvic adenopathy. No ascites.   No suspicious bone lesion.     IMPRESSION:   1. No suspicious T2 localizing lesion to suggest significant prostate   malignancy.   2. Sequela of chronic prostatitis with findings of BPH.         Dictated by: Britton Ceja M.D.      Previous 2/19/2024:  Patient was previously seen by me with last visit on 8/11/2023 for elevated PSA.  He is here for follow-up.        He is doing well with tamsulosin.         PSA  2/12/2024 8.79, PSAd 0.15     Previous 8/11/2023:  Patient was previously seen by me with last visit on 5/11/2023 for elevated PSA.  He did have an MRI of his prostate as well.  He was treated with a month-long course of doxycycline after his MRI.  He is here for follow-up. He reports he is not having any urinary symptoms since starting tamsulosin.      PSA  8/4/2023 8.56  PSAd   0.14     Previous 5/11/2023:  Patient was previously seen by me on 4/20/2023 with diagnosis of elevated PSA, BPH.  He was started on tamsulosin at that visit.  He will also had an MRI of his prostate which did show BPH  as well as chronic prostatitis.  He was started on doxycycline.  At his previous visit he was also started on tamsulosin.  He is here for follow-up PVR.  His last PVR was 137 prior to starting tamsulosin.     Symptoms have started to improve. Getting up less at night and having less urgency. He is currently on tamsulosin and doxycycline.      MRI of the prostate with and without contrast dated May 4, 2023.     INDICATION: Elevated PSA. No previous biopsy.     COMPARISON: None     FINDINGS: ADC and diffusion-weighted imaging is limited by susceptibility artifact from moderate to large amount of stool and air in the rectum.     Prostate gland is enlarged and measures 5.8 x 4.5 x 5.1 cm for total volume of 58.59 mL.     Heterogeneous signal intensity and enhancement in the enlarged central gland is related to BPH.     There is hazy and streaky decreased T2 signal intensity in the bilateral peripheral zones without significant restricted diffusion and favored to be related to chronic prostatitis. Midline posterior utricle cyst measures 1.4 cm.     Allowing for changes of prostatitis and susceptibility artifact, no suspicious T2 localizing lesion to suggest significant prostate malignancy.     Mildly distended bladder with mild prominence of the wall with obstruction from prostate hypertrophy not excluded. Seminal vesicles are symmetric.     No ascites or significant lymphadenopathy. Diverticulosis. Moderate to large amount of stool in the rectum. No suspicious osseous lesion.     IMPRESSION:   1. No suspicious T2 localizing lesion to suggest significant prostate malignancy.   2. Chronic prostatitis.   3. BPH.   4. Moderate to large amount of stool in the colon. Fecal impaction is not excluded.     Dictated by: Linnea Nunez M.D.     Images and Report reviewed and interpreted by: Linnea Nunez M.D.     <PS><Electronically signed by: Linnea Nunez M.D.>   05/04/2023 0946          Previous 4/20/2023:     Patient was  "seen by his PCP on 3/20/2023 for follow-up of an elevated PSA.  He had been treated with 21 days of Cipro.   Frequency- admits   Urgency- admits   Incontinence- post void dribble   Nocturia- 2   Stream- good   GH- denies    surgeries- denies   Family history of  malignancy- denies   Cardiopulmonary- denies   Anticoagulants- denies   Smoker- denies     PSA  3/21/2023 10.7  1/12/2023 9.05             Objective     Past Medical History:   Diagnosis Date    Hypertension        Past Surgical History:   Procedure Laterality Date    INNER EAR SURGERY Right          Current Outpatient Medications:     olmesartan (BENICAR) 5 MG tablet, Take 1 tablet by mouth Daily., Disp: , Rfl:     tamsulosin (FLOMAX) 0.4 MG capsule 24 hr capsule, TAKE 1 CAPSULE BY MOUTH DAILY, Disp: 90 capsule, Rfl: 4    Lactobacillus (Florajen Acidophilus) capsule, Take 1 capsule by mouth Daily. (Patient not taking: Reported on 3/12/2025), Disp: 28 capsule, Rfl: 0    Allergies   Allergen Reactions    Codeine Unknown - Low Severity        No family history on file.    Social History     Socioeconomic History    Marital status:    Tobacco Use    Smoking status: Never     Passive exposure: Never    Smokeless tobacco: Never   Vaping Use    Vaping status: Never Used       Vital Signs:   Resp 16   Ht 188 cm (74\")   Wt 96.8 kg (213 lb 6.5 oz)   BMI 27.40 kg/m²      Physical Exam  Vitals reviewed.   Constitutional:       Appearance: Normal appearance.   Neurological:      General: No focal deficit present.      Mental Status: He is alert and oriented to person, place, and time.   Psychiatric:         Mood and Affect: Mood normal.         Behavior: Behavior normal.          Result Review :   The following data was reviewed by: RACHAEL Castaneda on 03/12/2025:  Results for orders placed or performed in visit on 03/12/25   Bladder Scan    Collection Time: 03/12/25  7:53 AM   Result Value Ref Range    Urine Volume 0       PSA          " 10/21/2024    08:15 3/4/2025    08:33   PSA   PSA 10.600  13.900        Bladder Scan interpretation 03/12/2025    Estimation of residual urine via BVI 3000 Verathon Bladder Scan  MA/nurse performing: Dayanna AVELAR MA  Residual Urine: 0 ml  Indication: Elevated prostate specific antigen (PSA)   Position: Supine  Examination: Incremental scanning of the suprapubic area using 2.0 MHz transducer using copious amounts of acoustic gel.   Findings: An anechoic area was demonstrated which represented the bladder, with measurement of residual urine as noted. I inspected this myself. In that the residual urine was stable or insignificant, refer to plan for treatment and plan necessary at this time.       Results  Laboratory Studies  PSA levels have increased.      Procedures        Assessment and Plan    Diagnoses and all orders for this visit:    1. Elevated prostate specific antigen (PSA) (Primary)  -     Bladder Scan  -     Case Request; Standing  -     sodium chloride 0.9 % flush 3 mL  -     sodium chloride 0.9 % flush 10 mL  -     sodium chloride 0.9 % infusion 40 mL  -     cefOXItin (MEFOXIN) 2 g in sodium chloride 0.9 % 100 mL IVPB  -     Case Request    Other orders  -     Follow Anesthesia Guidelines / Protocol; Future  -     Follow Anesthesia Guidelines / Protocol; Standing  -     Verify / Perform Chlorhexidine Skin Prep; Standing  -     Provide NPO Instructions to Patient; Future  -     Chlorhexidine Skin Prep; Future  -     Insert Peripheral IV; Standing  -     Saline Lock & Maintain IV Access; Standing  -     Place Sequential Compression Device; Standing  -     Maintain Sequential Compression Device; Standing        Assessment & Plan  1. Elevated prostate-specific antigen (PSA) levels.  His PSA levels have shown an upward trend, necessitating further investigation. The potential influence of vitamin B12 supplementation on PSA levels was discussed. A random biopsy will be scheduled for him, with the procedure to be  conducted under anesthesia in the operating room. The biopsy will involve a rectal ultrasound and needle extraction of prostate tissue from 12 locations. He has been informed about the discomfort associated with the procedure and the need for a  post-procedure. He has been advised to continue his Flomax regimen.      He has been informed about the potential presence of blood in his urine, semen, and stool for up to 6 weeks post-procedure. He will receive a dose of antibiotics during the procedure to mitigate infection risk. He has been instructed to perform a Fleet enema 3 hours prior to his arrival time. The risks associated with anesthesia, including death, were discussed, as were the benefits of confirming or ruling out prostate cancer. He will be scheduled with Dr. Burgess on 3/27/2025              Follow Up   No follow-ups on file.  Patient was given instructions and counseling regarding his condition or for health maintenance advice. Please see specific information pulled into the AVS if appropriate.         This document has been electronically signed by RACHAEL Castaneda  March 12, 2025 08:09 EDT     Patient or patient representative verbalized consent for the use of Ambient Listening during the visit with  RACHAEL Castaneda for chart documentation. 3/12/2025  08:09 EDT

## 2025-03-12 ENCOUNTER — OFFICE VISIT (OUTPATIENT)
Dept: UROLOGY | Age: 65
End: 2025-03-12
Payer: MEDICARE

## 2025-03-12 VITALS — RESPIRATION RATE: 16 BRPM | BODY MASS INDEX: 27.39 KG/M2 | WEIGHT: 213.41 LBS | HEIGHT: 74 IN

## 2025-03-12 DIAGNOSIS — R97.20 ELEVATED PROSTATE SPECIFIC ANTIGEN (PSA): Primary | ICD-10-CM

## 2025-03-12 LAB — URINE VOLUME: 0

## 2025-03-12 RX ORDER — SODIUM CHLORIDE 9 MG/ML
40 INJECTION, SOLUTION INTRAVENOUS AS NEEDED
OUTPATIENT
Start: 2025-03-12

## 2025-03-12 RX ORDER — SODIUM CHLORIDE 0.9 % (FLUSH) 0.9 %
10 SYRINGE (ML) INJECTION AS NEEDED
OUTPATIENT
Start: 2025-03-12

## 2025-03-12 RX ORDER — SODIUM CHLORIDE 0.9 % (FLUSH) 0.9 %
3 SYRINGE (ML) INJECTION EVERY 12 HOURS SCHEDULED
OUTPATIENT
Start: 2025-03-12

## 2025-03-25 RX ORDER — MECOBAL/LEVOMEFOLAT CA/B6 PHOS 2-3-35 MG
1 TABLET ORAL DAILY
COMMUNITY

## 2025-03-25 RX ORDER — VITAMIN B COMPLEX
1 CAPSULE ORAL DAILY
COMMUNITY

## 2025-03-25 RX ORDER — LANOLIN ALCOHOL/MO/W.PET/CERES
1000 CREAM (GRAM) TOPICAL DAILY
COMMUNITY

## 2025-03-25 NOTE — PRE-PROCEDURE INSTRUCTIONS
PATIENT INSTRUCTED TO BE:    - NOTHING TO EAT AFTER MIDNIGHT OR CHEW, EXCEPT CAN HAVE CLEAR LIQUIDS 2 HOURS PRIOR TO SURGERY ARRIVAL TIME , NO MORE THAN 8 OZ. (NOTHING RED)     - TO HOLD ALL VITAMINS, SUPPLEMENTS, NSAIDS FOR ONE WEEK PRIOR TO THEIR SURGICAL PROCEDURE        INSTRUCTED NO LOTIONS, JEWELRY, PIERCINGS,  NAIL POLISH, OR DEODORANT DAY OF SURGERY    -INSTRUCTED TO TAKE THE FOLLOWING MEDICATIONS THE DAY OF SURGERY WITH SIPS OF WATER: TAMSULOSIN      - DO NOT BRING ANY MEDICATIONS WITH YOU TO THE HOSPITAL THE DAY OF SURGERY, EXCEPT IF USE INHALERS. BRING INHALERS DAY OF SURGERY         - DO NOT SMOKE OR VAPE 24 HOURS PRIOR TO PROCEDURE PER ANESTHESIA REQUEST     -MAKE SURE YOU HAVE A RIDE HOME OR SOMEONE TO STAY WITH YOU THE DAY OF THE PROCEDURE AFTER YOU GO HOME     - FOLLOW ANY OTHER INSTRUCTIONS GIVEN TO YOU BY YOUR SURGEON'S OFFICE.     DAY OF SURGERY _3/27/25 __ _______ AT [facility] ( [address]), YOU CAN  PARK OR SELF PARK. ENTER THE PAVILION THRU MAIN ENTRANCE, TAKE ELEVATORS TO THE FIRST FLOOR, CHECK IN AT THE DESK FOR REGISTRATION/ SURGERY.   - YOU WILL RECEIVE A PHONE CALL THE DAY PRIOR TO SURGERY BETWEEN 1PM AND 4 PM WITH ARRIVAL TIME, IF YOUR SURGERY IS ON A MONDAY YOU WILL RECEIVE A CALL THE FRIDAY PRIOR TO SURGERY DATE    - BRING CASH OR CREDIT CARD FOR COPAYMENT OF MEDICATIONS AFTER SURGERY IF YOU USE THE HOSPITAL PHARMACY (MEDS TO BED)    - PREADMISSION TESTING NURSE BRITNEY Newell [phone]  IF HAVE ANY QUESTIONS     -PATIENT PROVIDED THE NUMBER FOR PREOP SURGICAL DEPT IF HAD QUESTIONS AFTER HOURS PRIOR TO SURGERY ([phone]).  INFORMED PT IF NO ANSWER, LEAVE A MESSAGE AND SOMEONE WILL RETURN THEIR CALL       PATIENT VERBALIZED UNDERSTANDING       Clear Liquid Diet        Find out when you need to start a clear liquid diet.   Think of “clear liquids” as anything you could read a newspaper through. This includes things like water, broth, sports  drinks, or tea WITHOUT any kind of milk or cream.           Once you are told to start a clear liquid diet, only drink these things until 2 hours before arrival to the hospital or when the hospital says to stop. Total volume limitation: 8 oz.       Clear liquids you CAN drink:   Water   Clear broth: beef, chicken, vegetable, or bone broth with nothing in it   Gatorade   Lemonade or Arron-aid   Soda   Tea, coffee (NO cream or honey)   Jell-O (without fruit)   Popsicles (without fruit or cream)   Italian ices   Juice without pulp: apple, white, grape   You may use salt, pepper, and sugar  NO RED  NO NOODLES    Do NOT drink:   Milk or cream   Soy milk, almond milk, coconut milk, or other non-dairy drinks and   creamers   Milkshakes or smoothies   Tomato juice   Orange juice   Grapefruit juice   Cream soups or any other than broth         Clear Liquid Diet:  Do NOT eat any solid food.  Do NOT eat or suck on mints or candy.  Do NOT chew gum.  Do NOT drink thick liquids like milk or juice with pulp in it.  Do NOT add milk, cream, or anything like soy milk or almond milk to coffee or tea.

## 2025-03-27 ENCOUNTER — ANESTHESIA EVENT (OUTPATIENT)
Dept: PERIOP | Facility: HOSPITAL | Age: 65
End: 2025-03-27
Payer: MEDICARE

## 2025-03-27 ENCOUNTER — HOSPITAL ENCOUNTER (OUTPATIENT)
Facility: HOSPITAL | Age: 65
Setting detail: HOSPITAL OUTPATIENT SURGERY
Discharge: HOME | End: 2025-03-27
Attending: UROLOGY | Admitting: UROLOGY
Payer: MEDICARE

## 2025-03-27 ENCOUNTER — ANESTHESIA (OUTPATIENT)
Dept: PERIOP | Facility: HOSPITAL | Age: 65
End: 2025-03-27
Payer: MEDICARE

## 2025-03-27 VITALS
DIASTOLIC BLOOD PRESSURE: 83 MMHG | OXYGEN SATURATION: 100 % | HEART RATE: 59 BPM | HEIGHT: 74 IN | RESPIRATION RATE: 19 BRPM | BODY MASS INDEX: 28.29 KG/M2 | WEIGHT: 220.46 LBS | SYSTOLIC BLOOD PRESSURE: 144 MMHG | TEMPERATURE: 97.9 F

## 2025-03-27 DIAGNOSIS — R97.20 ELEVATED PROSTATE SPECIFIC ANTIGEN (PSA): ICD-10-CM

## 2025-03-27 PROCEDURE — 25810000003 LACTATED RINGERS PER 1000 ML: Performed by: ANESTHESIOLOGY

## 2025-03-27 PROCEDURE — S0260 H&P FOR SURGERY: HCPCS | Performed by: UROLOGY

## 2025-03-27 PROCEDURE — 76942 ECHO GUIDE FOR BIOPSY: CPT | Performed by: UROLOGY

## 2025-03-27 PROCEDURE — 55700 PR PROSTATE NEEDLE BIOPSY ANY APPROACH: CPT | Performed by: UROLOGY

## 2025-03-27 PROCEDURE — 88305 TISSUE EXAM BY PATHOLOGIST: CPT | Performed by: UROLOGY

## 2025-03-27 PROCEDURE — 25010000002 LIDOCAINE PF 2% 2 % SOLUTION

## 2025-03-27 PROCEDURE — 25010000002 MIDAZOLAM PER 1MG: Performed by: ANESTHESIOLOGY

## 2025-03-27 PROCEDURE — 25010000002 CEFOXITIN PER 1 G: Performed by: NURSE PRACTITIONER

## 2025-03-27 PROCEDURE — 25010000002 PROPOFOL 10 MG/ML EMULSION

## 2025-03-27 RX ORDER — SODIUM CHLORIDE 0.9 % (FLUSH) 0.9 %
3 SYRINGE (ML) INJECTION EVERY 12 HOURS SCHEDULED
Status: DISCONTINUED | OUTPATIENT
Start: 2025-03-27 | End: 2025-03-27 | Stop reason: HOSPADM

## 2025-03-27 RX ORDER — LIDOCAINE HYDROCHLORIDE 20 MG/ML
INJECTION, SOLUTION EPIDURAL; INFILTRATION; INTRACAUDAL; PERINEURAL AS NEEDED
Status: DISCONTINUED | OUTPATIENT
Start: 2025-03-27 | End: 2025-03-27 | Stop reason: SURG

## 2025-03-27 RX ORDER — PROMETHAZINE HYDROCHLORIDE 25 MG/1
25 SUPPOSITORY RECTAL ONCE AS NEEDED
Status: DISCONTINUED | OUTPATIENT
Start: 2025-03-27 | End: 2025-03-27 | Stop reason: HOSPADM

## 2025-03-27 RX ORDER — ONDANSETRON 2 MG/ML
4 INJECTION INTRAMUSCULAR; INTRAVENOUS ONCE AS NEEDED
Status: DISCONTINUED | OUTPATIENT
Start: 2025-03-27 | End: 2025-03-27 | Stop reason: HOSPADM

## 2025-03-27 RX ORDER — PROMETHAZINE HYDROCHLORIDE 25 MG/1
25 TABLET ORAL ONCE AS NEEDED
Status: DISCONTINUED | OUTPATIENT
Start: 2025-03-27 | End: 2025-03-27 | Stop reason: HOSPADM

## 2025-03-27 RX ORDER — SODIUM CHLORIDE 0.9 % (FLUSH) 0.9 %
10 SYRINGE (ML) INJECTION AS NEEDED
Status: DISCONTINUED | OUTPATIENT
Start: 2025-03-27 | End: 2025-03-27 | Stop reason: HOSPADM

## 2025-03-27 RX ORDER — ACETAMINOPHEN 325 MG/1
650 TABLET ORAL ONCE
Status: DISCONTINUED | OUTPATIENT
Start: 2025-03-27 | End: 2025-03-27 | Stop reason: HOSPADM

## 2025-03-27 RX ORDER — OXYCODONE HYDROCHLORIDE 5 MG/1
5 TABLET ORAL
Status: DISCONTINUED | OUTPATIENT
Start: 2025-03-27 | End: 2025-03-27 | Stop reason: HOSPADM

## 2025-03-27 RX ORDER — ACETAMINOPHEN 500 MG
1000 TABLET ORAL ONCE
Status: COMPLETED | OUTPATIENT
Start: 2025-03-27 | End: 2025-03-27

## 2025-03-27 RX ORDER — IBUPROFEN 600 MG/1
600 TABLET, FILM COATED ORAL EVERY 6 HOURS PRN
Status: DISCONTINUED | OUTPATIENT
Start: 2025-03-27 | End: 2025-03-27 | Stop reason: HOSPADM

## 2025-03-27 RX ORDER — SODIUM CHLORIDE, SODIUM LACTATE, POTASSIUM CHLORIDE, CALCIUM CHLORIDE 600; 310; 30; 20 MG/100ML; MG/100ML; MG/100ML; MG/100ML
9 INJECTION, SOLUTION INTRAVENOUS CONTINUOUS PRN
Status: DISCONTINUED | OUTPATIENT
Start: 2025-03-27 | End: 2025-03-27 | Stop reason: HOSPADM

## 2025-03-27 RX ORDER — PROPOFOL 10 MG/ML
VIAL (ML) INTRAVENOUS AS NEEDED
Status: DISCONTINUED | OUTPATIENT
Start: 2025-03-27 | End: 2025-03-27 | Stop reason: SURG

## 2025-03-27 RX ORDER — MIDAZOLAM HYDROCHLORIDE 2 MG/2ML
2 INJECTION, SOLUTION INTRAMUSCULAR; INTRAVENOUS ONCE
Status: COMPLETED | OUTPATIENT
Start: 2025-03-27 | End: 2025-03-27

## 2025-03-27 RX ORDER — PROMETHAZINE HYDROCHLORIDE 12.5 MG/1
12.5 TABLET ORAL ONCE AS NEEDED
Status: DISCONTINUED | OUTPATIENT
Start: 2025-03-27 | End: 2025-03-27 | Stop reason: HOSPADM

## 2025-03-27 RX ORDER — SODIUM CHLORIDE 9 MG/ML
40 INJECTION, SOLUTION INTRAVENOUS AS NEEDED
Status: DISCONTINUED | OUTPATIENT
Start: 2025-03-27 | End: 2025-03-27 | Stop reason: HOSPADM

## 2025-03-27 RX ORDER — EPHEDRINE SULFATE 50 MG/ML
INJECTION INTRAVENOUS AS NEEDED
Status: DISCONTINUED | OUTPATIENT
Start: 2025-03-27 | End: 2025-03-27 | Stop reason: SURG

## 2025-03-27 RX ADMIN — SODIUM CHLORIDE 2 G: 9 INJECTION, SOLUTION INTRAVENOUS at 09:51

## 2025-03-27 RX ADMIN — EPHEDRINE SULFATE 10 MG: 50 INJECTION INTRAVENOUS at 09:57

## 2025-03-27 RX ADMIN — ACETAMINOPHEN 1000 MG: 500 TABLET ORAL at 08:18

## 2025-03-27 RX ADMIN — MIDAZOLAM HYDROCHLORIDE 2 MG: 1 INJECTION, SOLUTION INTRAMUSCULAR; INTRAVENOUS at 09:28

## 2025-03-27 RX ADMIN — LIDOCAINE HYDROCHLORIDE 100 MG: 20 INJECTION, SOLUTION INTRAVENOUS at 09:50

## 2025-03-27 RX ADMIN — SODIUM CHLORIDE, SODIUM LACTATE, POTASSIUM CHLORIDE, CALCIUM CHLORIDE 9 ML/HR: 20; 30; 600; 310 INJECTION, SOLUTION INTRAVENOUS at 08:17

## 2025-03-27 RX ADMIN — PROPOFOL 200 MCG/KG/MIN: 10 INJECTION, EMULSION INTRAVENOUS at 09:50

## 2025-03-27 RX ADMIN — PROPOFOL 60 MG: 10 INJECTION, EMULSION INTRAVENOUS at 09:50

## 2025-03-27 NOTE — ANESTHESIA PREPROCEDURE EVALUATION
Anesthesia Evaluation     Patient summary reviewed and Nursing notes reviewed                Airway   Mallampati: I  TM distance: >3 FB  Neck ROM: full  No difficulty expected  Dental      Pulmonary - negative pulmonary ROS and normal exam    breath sounds clear to auscultation  Cardiovascular - normal exam    Rhythm: regular  Rate: normal    (+) hypertension      Neuro/Psych- negative ROS  GI/Hepatic/Renal/Endo - negative ROS     Musculoskeletal     Abdominal    Substance History - negative use     OB/GYN negative ob/gyn ROS         Other   arthritis,                 Anesthesia Plan    ASA 2     MAC and general     intravenous induction     Anesthetic plan, risks, benefits, and alternatives have been provided, discussed and informed consent has been obtained with: patient.    Plan discussed with CRNA.    CODE STATUS:

## 2025-03-27 NOTE — H&P
[facility]   Urology HISTORY AND PHYSICAL    Patient Name: Nasir Peck  : 1960  MRN: [id number]  Primary Care Physician:  Flaquita Morales APRN  Date of admission: 3/27/2025    Subjective   Subjective     Patient has an elevated PSA and lesion of the prostate and presents for MRI fusion transrectal ultrasound and biopsy of the prostate    Personal History     Past Medical History:   Diagnosis Date    Arthritis     Hypertension        Past Surgical History:   Procedure Laterality Date    COLONOSCOPY      INNER EAR SURGERY Right        Family History: family history is not on file. Otherwise pertinent FHx was reviewed and not pertinent to current issue.    Social History:  reports that he has never smoked. He has never been exposed to tobacco smoke. He has never used smokeless tobacco. He reports current alcohol use of about 3.0 standard drinks of alcohol per week. He reports that he does not use drugs.    Home Medications:  Alpha-Lipoic Acid, Benfotiamine, Dietary Management Product, Krill Oil, L-Methylfolate-B6-B12, olmesartan, tamsulosin, vitamin B-12, and vitamin b complex    Allergies:  Allergies   Allergen Reactions    Codeine Unknown - Low Severity     CANNOT RECALL REACTION FROM 30 YEARS AGO        Objective    Objective     Vitals:   Temp:  [97.5 °F (36.4 °C)] 97.5 °F (36.4 °C)  Heart Rate:  [54] 54  Resp:  [18] 18  BP: (147)/(88) 147/88    Physical Exam  Constitutional:       Appearance: Normal appearance.   Cardiovascular:      Rate and Rhythm: Normal rate and regular rhythm.   Pulmonary:      Effort: Pulmonary effort is normal.      Breath sounds: Normal breath sounds.   Neurological:      Mental Status: He is alert. Mental status is at baseline.   Psychiatric:         Mood and Affect: Mood and affect normal.         Speech: Speech normal.         Judgment: Judgment normal.         Result Review    Result Review:  I have personally reviewed the results from the time of this admission to  3/27/2025 09:38 EDT and agree with these findings:  [x]  Laboratory  []  Microbiology  [x]  Radiology  []  EKG/Telemetry   []  Cardiology/Vascular   [x]  Pathology  [x]  Old records  []  Other:      Assessment & Plan   Assessment / Plan       Active Hospital Problems:  Active Hospital Problems    Diagnosis     **Elevated prostate specific antigen (PSA)        Plan: MRI fusion transrectal ultrasound and biopsy of the prostate  Risks and benefits discussed with patient and they are agreeable to proceed.    VTE Prophylaxis:  Mechanical VTE prophylaxis orders are present.        CODE STATUS:           Electronically signed by Almaz Burgess MD, 03/27/25, 9:38 AM EDT.

## 2025-03-27 NOTE — DISCHARGE INSTRUCTIONS
Discharge Instructions Prostate Biopsy       For your surgery you had:  General anesthesia (you may have a sore throat for the first 24 hours)  IV sedation  You may experience dizziness, drowsiness, or lightheadedness for several hours following surgery.  Do not stay alone today or tonight.  Limit your activity for 24 hours.  You should not drive, operate machinery, drink alcohol, or sign legally binding documents for 24 hours or while you are taking pain medication.  Resume your diet slowly.  Follow any special dietary instructions you may have been given by your doctor.  Last dose of pain medication given at: Tylenol given 0830 wait 4 hours to take any additional tyleonol   .  NOTIFY YOUR DOCTOR IF YOU EXPERIENCE ANY OF THE FOLLOWING:  Temperature greater than 101 degrees Fahrenheit  Shaking Chills  Redness or excessive drainage from incision  Nausea, vomiting and/or pain that is not controlled by prescribed medications  Increase in bleeding or bleeding that is excessive  Unable to urinate in 6 hours after surgery  If unable to reach your doctor, please go to the closest Emergency Room.   Drink 4-6 glasses of water a day for 3-4 days  You may see blood in your urine for up to a week, blood in your stool for 3-4 days, and blood in semen for up to a month  If you are passing large clots, call your doctor  Avoid lifting or straining for 48 hours  It is normal to experience burning during urination for 48 hours after biopsy  Call your doctor if pain, burning, urgency, or frequency of urination persist beyond 48 hours  Medications per physician as indicated on discharge medication information sheet    SPECIAL INSTRUCTIONS:

## 2025-03-27 NOTE — ANESTHESIA POSTPROCEDURE EVALUATION
Patient: Nasir Peck    Procedure Summary       Date: 03/27/25 Room / Location: [facility] OR 09 / [facility] MAIN OR    Anesthesia Start: 0943 Anesthesia Stop: 1005    Procedure: PROSTATE ULTRASOUND BIOPSY  Ultrasound of the prostate through the rectum with biopsies of the prostate (Rectum) Diagnosis:       Elevated prostate specific antigen (PSA)      (Elevated prostate specific antigen (PSA) [R97.20])    Surgeons: Almaz Burgess MD Provider: Silas Chatterjee MD    Anesthesia Type: MAC, general ASA Status: 2            Anesthesia Type: MAC, general    Vitals  Vitals Value Taken Time   /81 03/27/25 10:35   Temp 36.3 °C (97.3 °F) 03/27/25 10:30   Pulse 57 03/27/25 10:39   Resp 18 03/27/25 10:35   SpO2 98 % 03/27/25 10:39   Vitals shown include unfiled device data.        Post Anesthesia Care and Evaluation    Patient location during evaluation: bedside  Patient participation: complete - patient participated  Level of consciousness: awake    Airway patency: patent  PONV Status: none  Cardiovascular status: acceptable  Respiratory status: acceptable  Hydration status: acceptable

## 2025-03-27 NOTE — OP NOTE
PROSTATE ULTRASOUND BIOPSY  Procedure Report    Patient Name:  Nasir Peck  YOB: 1960    Date of Surgery:  3/27/2025     Pre-op Diagnosis:   Elevated prostate specific antigen (PSA) [R97.20]       Post-Op Diagnosis Codes:     * Elevated prostate specific antigen (PSA) [R97.20]      Procedure/CPT® Codes:    Procedure(s):  PROSTATE ULTRASOUND BIOPSY        Staff:  Surgeon(s):  Almaz Burgess MD         Anesthesia: Sedation    Estimated Blood Loss: 0 mL    Implants:    Nothing was implanted during the procedure    Specimen:          Specimens       ID Source Type Tests Collected By Collected At Frozen?    A Prostate Tissue TISSUE PATHOLOGY EXAM   Almaz Burgess MD 3/27/25 0959     Description: RIGHT SIDE X6    B Prostate Tissue TISSUE PATHOLOGY EXAM   Almaz Burgess MD 3/27/25 0959     Description: LEFT SIDE X7                Complications: None    Description of Procedure:     An ultrasound probe was placed into the rectum and the prostate was inspected.  No obvious lesions were seen.    The prostate was then imaged and examined using the ultrasound.      The right and left sides of the prostate were then biopsied randomly using the biopsy needle.  They were labeled as right and left prostate biopsy.  6 were taken on the left and 6 on the right.     The patient tolerated this well.  The ultrasound probe was removed.  He was transferred to the PACU in stable condition.         Almaz Burgess MD     Date: 3/27/2025  Time: 10:07 EDT

## 2025-03-28 LAB
CYTO UR: NORMAL
LAB AP CASE REPORT: NORMAL
LAB AP CLINICAL INFORMATION: NORMAL
PATH REPORT.FINAL DX SPEC: NORMAL
PATH REPORT.GROSS SPEC: NORMAL

## 2025-04-09 ENCOUNTER — OFFICE VISIT (OUTPATIENT)
Dept: UROLOGY | Age: 65
End: 2025-04-09
Payer: MEDICARE

## 2025-04-09 VITALS — WEIGHT: 220 LBS | BODY MASS INDEX: 28.23 KG/M2 | HEIGHT: 74 IN

## 2025-04-09 DIAGNOSIS — N13.8 BPH WITH OBSTRUCTION/LOWER URINARY TRACT SYMPTOMS: ICD-10-CM

## 2025-04-09 DIAGNOSIS — N40.1 BPH WITH OBSTRUCTION/LOWER URINARY TRACT SYMPTOMS: ICD-10-CM

## 2025-04-09 DIAGNOSIS — R97.20 ELEVATED PROSTATE SPECIFIC ANTIGEN (PSA): Primary | ICD-10-CM

## 2025-04-09 LAB
BILIRUB BLD-MCNC: NEGATIVE MG/DL
CLARITY, POC: CLEAR
COLOR UR: YELLOW
EXPIRATION DATE: ABNORMAL
GLUCOSE UR STRIP-MCNC: NEGATIVE MG/DL
KETONES UR QL: NEGATIVE
LEUKOCYTE EST, POC: ABNORMAL
Lab: ABNORMAL
NITRITE UR-MCNC: NEGATIVE MG/ML
PH UR: 5.5 [PH] (ref 5–8)
PROT UR STRIP-MCNC: NEGATIVE MG/DL
RBC # UR STRIP: ABNORMAL /UL
SP GR UR: 1.01 (ref 1–1.03)
UROBILINOGEN UR QL: ABNORMAL

## 2025-04-09 NOTE — PROGRESS NOTES
Chief Complaint  Elevated PSA    Subjective            Nasir Peck presents to [facility]    History of Present Illness  The patient presents for evaluation of elevated PSA and urinary frequency.    He reports a persistent sensation of needing to urinate, despite not experiencing any difficulties with initiating urination. However, he does acknowledge some challenges in fully emptying his bladder. He is currently on a low dose of Flomax.    MEDICATIONS  Flomax  HISTORY  -----------------------  The patient is a 64-year-old male who presents for evaluation of elevated PSA.     He reports an increase in his prostate-specific antigen (PSA) levels. He has been experiencing frequent urination, which he manages with Flomax. He reports satisfactory urination but expresses concern over the urgency of his urinary urges. He is not currently on any anticoagulant therapy or injectable medications for diabetes or weight management. He has previously undergone a colonoscopy under anesthesia without any complications. He has expressed a preference for Dr. Burgess to perform the biopsy, as recommended by a colleague. He is scheduled to be on vacation from 05/03/2025 through the following week.     He has been taking vitamin B12 supplements since December 2024 due to neuropathic symptoms in his feet, which are more pronounced at night. He was prescribed analgesics for this condition but has chosen not to take them.           PSA  3/4/2025 13.9, PSAd 0.23     Previous 12/12/2024:  Patient was previously seen by me with last visit on 4/24/2024 for elevated PSA.  At that visit he is here today to go over results of MRI.        He reports tamsulosin is helpful.         PSA  10/21/2024 10.6     DATE OF EXAM: 12/04/2024     CLINICAL HISTORY: ELEVATED PSA. Patient has had 2 prior prostate MRI   and no biopsies.     COMPARISON: Prostate MR 4/22/24       FINDINGS:   Prostate volume: 61 cc   PSA: 10.6 ng/mL.    PSA density: 0.17 ng/mL^2.     Peripheral zone: There is a midline posterior prostatic utricle cyst,   unchanged. There are wedge-shaped peripheral zone T2 hypointensities   in keeping with sequela prostatitis. Diffusion-weighted images is   mildly limited due to rectal gas susceptibility artifact, within these   limitations no high-grade focal diffusion restriction is seen to   suggest a peripheral zone lesion.     Transition zone: There are changes related to benign prostatic   hypertrophy. The gland protrudes superiorly into the bladder base.     Central zone: Normal.     Extracapsular extension: There is no evidence of extracapsular   extension.     The seminal vesicles and neurovascular bundles are normal. No pelvic   lymphadenopathy.       The prostate was assessed using the PI-RADS 2 scoring system.     There are no suspicious lesions in the prostate (PI-RADS 3 or greater)     Other findings: Urinary bladder is thick-walled and trabeculated in   keeping with chronic bladder outlet obstruction. There is a small   fat-containing left internal hernia. There is colonic diverticulosis   without diverticulitis.     IMPRESSION:     1. No MR evidence for clinically significant prostate malignancy.   2. Sequela prostatitis and benign prostatic hyperplasia.           Dictated by: Roc Fish M.D.      Previous 4/24/2024:  Patient was previously seen by me last visit on 2/19/2024 for elevated PSA.  At that visit we did discuss doing the exosome urine test.  His exosome did show a score of 22.24.  So he then agreed to repeat his MRI of the prostate.  He is here for follow-up.        EXAM: MRI PROSTATE WITH AND WITHOUT CONTRAST     DATE OF EXAM: 04/18/2024     CLINICAL HISTORY: Dx: Elevated PSA [R97.20 (ICD-10-CM)]     COMPARISON: May 4, 2023       FINDINGS:   Prostate size: 5.6 x 4.6 x 5.1 cm. Prostate volume: 58.75 cc   PSA density: 0.15     The prostate transition zone is noted to be enlarged with benign   prostatic  hyperplasia.   There is median lobe hypertrophy indenting upon the base of the   urinary bladder.   Regions subtle hazy and streaky appearing decreased T2 signal   intensity in the bilateral peripheral zone of the prostate gland are   without significant restricted diffusion component and thought likely   related to chronic prostatitis.   The appearance is similar to comparison.   A midline posterior utricle cyst measures approximately 1.4 cm;   stable.     The prostate was assessed using the PI-RADS 2 scoring system.     Allowing for changes of prostatitis, no suspicious T2 localizing   lesion to suggest significant prostate malignancy.     The following lesions are of at least intermediate suspicion (PI-RADS   3 or greater):     Normal appearance of the seminal vesicles.   Normal rectal prosthetic aortic angle.   No pelvic adenopathy. No ascites.   No suspicious bone lesion.     IMPRESSION:   1. No suspicious T2 localizing lesion to suggest significant prostate   malignancy.   2. Sequela of chronic prostatitis with findings of BPH.         Dictated by: Britton Ceja M.D.      Previous 2/19/2024:  Patient was previously seen by me with last visit on 8/11/2023 for elevated PSA.  He is here for follow-up.        He is doing well with tamsulosin.         PSA  2/12/2024 8.79, PSAd 0.15     Previous 8/11/2023:  Patient was previously seen by me with last visit on 5/11/2023 for elevated PSA.  He did have an MRI of his prostate as well.  He was treated with a month-long course of doxycycline after his MRI.  He is here for follow-up. He reports he is not having any urinary symptoms since starting tamsulosin.      PSA  8/4/2023 8.56  PSAd   0.14     Previous 5/11/2023:  Patient was previously seen by me on 4/20/2023 with diagnosis of elevated PSA, BPH.  He was started on tamsulosin at that visit.  He will also had an MRI of his prostate which did show BPH as well as chronic prostatitis.  He was started on doxycycline.   At his previous visit he was also started on tamsulosin.  He is here for follow-up PVR.  His last PVR was 137 prior to starting tamsulosin.     Symptoms have started to improve. Getting up less at night and having less urgency. He is currently on tamsulosin and doxycycline.      MRI of the prostate with and without contrast dated May 4, 2023.     INDICATION: Elevated PSA. No previous biopsy.     COMPARISON: None     FINDINGS: ADC and diffusion-weighted imaging is limited by susceptibility artifact from moderate to large amount of stool and air in the rectum.     Prostate gland is enlarged and measures 5.8 x 4.5 x 5.1 cm for total volume of 58.59 mL.     Heterogeneous signal intensity and enhancement in the enlarged central gland is related to BPH.     There is hazy and streaky decreased T2 signal intensity in the bilateral peripheral zones without significant restricted diffusion and favored to be related to chronic prostatitis. Midline posterior utricle cyst measures 1.4 cm.     Allowing for changes of prostatitis and susceptibility artifact, no suspicious T2 localizing lesion to suggest significant prostate malignancy.     Mildly distended bladder with mild prominence of the wall with obstruction from prostate hypertrophy not excluded. Seminal vesicles are symmetric.     No ascites or significant lymphadenopathy. Diverticulosis. Moderate to large amount of stool in the rectum. No suspicious osseous lesion.     IMPRESSION:   1. No suspicious T2 localizing lesion to suggest significant prostate malignancy.   2. Chronic prostatitis.   3. BPH.   4. Moderate to large amount of stool in the colon. Fecal impaction is not excluded.     Dictated by: Linnea Nunez M.D.     Images and Report reviewed and interpreted by: Linnea Nunez M.D.     <PS><Electronically signed by: Linnea Nunez M.D.>   05/04/2023 0946          Previous 4/20/2023:     Patient was seen by his PCP on 3/20/2023 for follow-up of an elevated PSA.   "He had been treated with 21 days of Cipro.   Frequency- admits   Urgency- admits   Incontinence- post void dribble   Nocturia- 2   Stream- good   GH- denies    surgeries- denies   Family history of  malignancy- denies   Cardiopulmonary- denies   Anticoagulants- denies   Smoker- denies     PSA  3/21/2023 10.7  1/12/2023 9.05    Objective   Vital Signs:   Ht 188 cm (74\")   Wt 99.8 kg (220 lb)   BMI 28.25 kg/m²       Physical Exam  Vitals and nursing note reviewed.   Constitutional:       Appearance: Normal appearance. He is well-developed.   Pulmonary:      Effort: Pulmonary effort is normal.      Breath sounds: Normal air entry.   Neurological:      Mental Status: He is alert and oriented to person, place, and time.      Motor: Motor function is intact.   Psychiatric:         Mood and Affect: Mood normal.         Behavior: Behavior normal.          Result Review :   The following data was reviewed by: Almaz Burgess MD on 04/09/2025:    Results for orders placed or performed in visit on 04/09/25   POC Urinalysis Dipstick, Automated    Collection Time: 04/09/25 10:08 AM    Specimen: Urine   Result Value Ref Range    Color Yellow Yellow, Straw, Dark Yellow, Ruby    Clarity, UA Clear Clear    Specific Gravity  1.015 1.005 - 1.030    pH, Urine 5.5 5.0 - 8.0    Leukocytes Trace (A) Negative    Nitrite, UA Negative Negative    Protein, POC Negative Negative mg/dL    Glucose, UA Negative Negative mg/dL    Ketones, UA Negative Negative    Urobilinogen, UA 0.2 E.U./dL Normal, 0.2 E.U./dL    Bilirubin Negative Negative    Blood, UA Moderate (A) Negative    Lot Number [id number]     Expiration Date 32,026      PSA          10/21/2024    08:15 3/4/2025    08:33   PSA   PSA 10.600  13.900        Results  Laboratory Studies  Prostate biopsy showed no cancer, with one side totally negative and the other showing small acinar cell, polar fraction.            Component  Ref Range & Units (hover)    Case Report   Surgical " Pathology Report                         Case: [id number]                                   Authorizing Provider:  Almaz Burgess MD      Collected:           03/27/2025 09:59 AM           Ordering Location:     [facility] MAIN Received:            03/27/2025 10:40 AM                                  OR                                                                           Pathologist:           Kaleb Santamaria MD                                                             Specimens:   1) - Prostate, RIGHT SIDE X6                                                                        2) - Prostate, LEFT SIDE X7                                                                Clinical Information    Elevated prostate specific antigen (PSA)   Final Diagnosis   1. Prostate gland, right, needle core biopsy:               - Atypical small acinar proliferation        2. Prostate gland, left , needle core biopsy:               - Benign prostatic tissue   Electronically signed by Kaleb Santamaria MD on 3/28/2025 at 1132 EDT               Assessment and Plan    Diagnoses and all orders for this visit:    1. Elevated prostate specific antigen (PSA) (Primary)  -     POC Urinalysis Dipstick, Automated      Assessment & Plan  1. Elevated Prostate-Specific Antigen (PSA).  His biopsy results were satisfactory, indicating no presence of prostate cancer. The MRI and random biopsy did not reveal any abnormalities. It was explained that elevated PSA levels can fluctuate and are not uncommon. He will continue to monitor his PSA levels. A follow-up appointment with Maci has been scheduled for 6 months from now. A PSA order has been placed, which he should complete prior to his next appointment.    2. Urinary Frequency.  He reports feeling the need to urinate frequently and has some trouble finishing urination. He is currently on a lower dose of Flomax. The option to increase the Flomax dosage to two times a day was  discussed, but he prefers to maintain the current regimen. The potential use of finasteride, which can shrink the prostate over time and reduce PSA levels, was also discussed, but he opted to continue with the current treatment.    Follow-up  The patient will follow up in 6 months or earlier if necessary.          Follow Up       No follow-ups on file.  Patient was given instructions and counseling regarding his condition or for health maintenance advice. Please see specific information pulled into the AVS if appropriate.     Transcribed from ambient dictation for Almaz Burgess MD by Almaz Burgess MD.  04/09/25   10:41 EDT    Patient or patient representative verbalized consent for the use of Ambient Listening during the visit with  Almaz Burgess MD for chart documentation. 4/9/2025  10:41 EDT

## 2025-06-18 ENCOUNTER — OFFICE VISIT (OUTPATIENT)
Dept: SURGERY | Facility: CLINIC | Age: 65
End: 2025-06-18
Payer: MEDICARE

## 2025-06-18 ENCOUNTER — PREP FOR SURGERY (OUTPATIENT)
Dept: OTHER | Facility: HOSPITAL | Age: 65
End: 2025-06-18
Payer: MEDICARE

## 2025-06-18 VITALS
WEIGHT: 225.75 LBS | SYSTOLIC BLOOD PRESSURE: 144 MMHG | DIASTOLIC BLOOD PRESSURE: 81 MMHG | OXYGEN SATURATION: 95 % | BODY MASS INDEX: 28.97 KG/M2 | HEIGHT: 74 IN | HEART RATE: 94 BPM

## 2025-06-18 DIAGNOSIS — Z12.11 ENCOUNTER FOR SCREENING FOR MALIGNANT NEOPLASM OF COLON: Primary | ICD-10-CM

## 2025-06-18 DIAGNOSIS — Z12.11 SCREENING FOR MALIGNANT NEOPLASM OF COLON: Primary | ICD-10-CM

## 2025-06-18 RX ORDER — POLYETHYLENE GLYCOL 3350 17 G/17G
POWDER, FOR SOLUTION ORAL
Qty: 238 PACKET | Refills: 0 | Status: SHIPPED | OUTPATIENT
Start: 2025-06-18

## 2025-06-18 RX ORDER — SODIUM CHLORIDE 9 MG/ML
40 INJECTION, SOLUTION INTRAVENOUS AS NEEDED
OUTPATIENT
Start: 2025-06-18

## 2025-06-18 RX ORDER — SODIUM CHLORIDE 0.9 % (FLUSH) 0.9 %
10 SYRINGE (ML) INJECTION AS NEEDED
OUTPATIENT
Start: 2025-06-18

## 2025-06-18 RX ORDER — SODIUM CHLORIDE 0.9 % (FLUSH) 0.9 %
3 SYRINGE (ML) INJECTION EVERY 12 HOURS SCHEDULED
OUTPATIENT
Start: 2025-06-18

## 2025-06-18 NOTE — PROGRESS NOTES
Chief Complaint: Colonoscopy    Subjective      Colonoscopy consultation       History of Present Illness  Nasir Peck is a 65 y.o. male presents to [facility] for colonoscopy consultation.    Patient presents today on referral from Flaquita Duffy for colonoscopy consultation.  Patient denies any abdominal pain, change in bowel habit, or rectal bleeding.  Denies any family history of colorectal cancer.      Denies SERGIO.  Denies any cardiac issues.  Denies taking any GLP-receptors.    4/15: Colonoscopy (sergei): normal colon.      Objective     Past Medical History:   Diagnosis Date    Arthritis     Benign prostatic hyperplasia     Elevated PSA     Hypertension        Past Surgical History:   Procedure Laterality Date    COLONOSCOPY      INNER EAR SURGERY Right     PROSTATE SURGERY  03-    Biopsy    PROSTATE ULTRASOUND BIOPSY N/A 03/27/2025    Procedure: PROSTATE ULTRASOUND BIOPSY  Ultrasound of the prostate through the rectum with biopsies of the prostate;  Surgeon: Almaz Burgess MD;  Location: [facility] MAIN OR;  Service: Urology;  Laterality: N/A;    VASECTOMY         Outpatient Medications Marked as Taking for the 6/18/25 encounter (Office Visit) with Guerita Arenas APRN   Medication Sig Dispense Refill    ALPHA LIPOIC ACID PO Take 600 mg by mouth Daily.      B Complex Vitamins (vitamin b complex) capsule capsule Take 1 capsule by mouth Daily.      BENFOTIAMINE PO Take 300 mg by mouth Daily.      Dietary Management Product (VB6 P5P PO) Take 50 mg by mouth Daily.      KRILL OIL PO Take 1 tablet by mouth Daily.      L-Methylfolate-B6-B12 3-35-2 MG tablet Take 1 tablet by mouth Daily.      olmesartan (BENICAR) 5 MG tablet Take 1 tablet by mouth Daily.      tamsulosin (FLOMAX) 0.4 MG capsule 24 hr capsule TAKE 1 CAPSULE BY MOUTH DAILY 90 capsule 4    vitamin B-12 (CYANOCOBALAMIN) 1000 MCG tablet Take 1 tablet by mouth Daily.         Allergies   Allergen Reactions     "Codeine Unknown - Low Severity     CANNOT RECALL REACTION FROM 30 YEARS AGO         Family History   Problem Relation Age of Onset    Cancer Mother     Kidney disease Mother     Malig Hyperthermia Neg Hx        Social History     Socioeconomic History    Marital status:    Tobacco Use    Smoking status: Never     Passive exposure: Never    Smokeless tobacco: Never   Vaping Use    Vaping status: Never Used   Substance and Sexual Activity    Alcohol use: Yes     Alcohol/week: 4.0 standard drinks of alcohol     Types: 3 Cans of beer, 1 Shots of liquor per week     Comment: 2 -3 beers once per week    Drug use: Never    Sexual activity: Yes     Partners: Female     Birth control/protection: None, Vasectomy       Review of Systems   Constitutional:  Negative for chills and fever.   Gastrointestinal:  Negative for abdominal distention, abdominal pain, anal bleeding, blood in stool, constipation, diarrhea and rectal pain.        Vital Signs:   /81 (BP Location: Left arm, Patient Position: Sitting, Cuff Size: Adult)   Pulse 94   Ht 188 cm (74\")   Wt 102 kg (225 lb 12 oz)   SpO2 95%   BMI 28.98 kg/m²      Physical Exam  Vitals and nursing note reviewed.   Constitutional:       General: He is not in acute distress.     Appearance: He is obese. He is not ill-appearing.   HENT:      Head: Normocephalic and atraumatic.   Cardiovascular:      Rate and Rhythm: Normal rate.   Pulmonary:      Effort: Pulmonary effort is normal.      Breath sounds: No stridor.   Abdominal:      Palpations: Abdomen is soft.      Tenderness: There is no guarding.   Musculoskeletal:         General: No deformity. Normal range of motion.   Skin:     General: Skin is warm and dry.      Coloration: Skin is not jaundiced.   Neurological:      General: No focal deficit present.      Mental Status: He is alert and oriented to person, place, and time.   Psychiatric:         Mood and Affect: Mood normal.         Thought Content: Thought " content normal.          Result Review :          []  Laboratory  []  Radiology  []  Pathology  []  Microbiology  []  EKG/Telemetry   []  Cardiology/Vascular   []  Old records  I spent 15 minutes caring for Nasir on this date of service. This time includes time spent by me in the following activities: reviewing tests, obtaining and/or reviewing a separately obtained history, performing a medically appropriate examination and/or evaluation, ordering medications, tests, or procedures, and documenting information in the medical record        Assessment and Plan    Diagnoses and all orders for this visit:    1. Encounter for screening for malignant neoplasm of colon (Primary)    Other orders  -     polyethylene glycol (MIRALAX) 17 g packet; Take as directed.  Instructions given in office.  Dispense: 238 g bottle  Dispense: 238 packet; Refill: 0        Follow Up   Return for Schedule colonoscopy with Dr. Chacon on 8/11/2025 at [facility].    Hospital arrival time: 0800.    Possible risks/complications, benefits, and alternatives to surgical or invasive procedures have been explained to patient and/or legal guardian.    Patient has been evaluated and can tolerate anesthesia and/or sedation. Risks, benefits, and alternatives to anesthesia and sedation have been explained to the patient and/or legal guardian. Patient verbalizes understanding and is willing to proceed with the above plan.     Patient was given instructions and counseling regarding his condition or for health maintenance advice. Please see specific information pulled into the AVS if appropriate.     As always, it has been a pleasure to participate in your patient's care. Please call with questions or concerns.

## 2025-08-08 ENCOUNTER — ANESTHESIA EVENT (OUTPATIENT)
Dept: GASTROENTEROLOGY | Facility: HOSPITAL | Age: 65
End: 2025-08-08
Payer: MEDICARE

## 2025-08-08 RX ORDER — SODIUM CHLORIDE, SODIUM LACTATE, POTASSIUM CHLORIDE, CALCIUM CHLORIDE 600; 310; 30; 20 MG/100ML; MG/100ML; MG/100ML; MG/100ML
30 INJECTION, SOLUTION INTRAVENOUS CONTINUOUS
Status: CANCELLED | OUTPATIENT
Start: 2025-08-11 | End: 2025-08-12

## 2025-08-11 ENCOUNTER — ANESTHESIA (OUTPATIENT)
Dept: GASTROENTEROLOGY | Facility: HOSPITAL | Age: 65
End: 2025-08-11
Payer: MEDICARE

## 2025-08-11 ENCOUNTER — HOSPITAL ENCOUNTER (OUTPATIENT)
Facility: HOSPITAL | Age: 65
Setting detail: HOSPITAL OUTPATIENT SURGERY
Discharge: HOME | End: 2025-08-11
Attending: SURGERY | Admitting: SURGERY
Payer: MEDICARE

## 2025-08-11 VITALS
TEMPERATURE: 97.8 F | DIASTOLIC BLOOD PRESSURE: 66 MMHG | OXYGEN SATURATION: 99 % | WEIGHT: 214.07 LBS | RESPIRATION RATE: 15 BRPM | HEART RATE: 56 BPM | BODY MASS INDEX: 27.48 KG/M2 | SYSTOLIC BLOOD PRESSURE: 101 MMHG

## 2025-08-11 DIAGNOSIS — Z12.11 SCREENING FOR MALIGNANT NEOPLASM OF COLON: ICD-10-CM

## 2025-08-11 PROCEDURE — 25010000002 LIDOCAINE PF 2% 2 % SOLUTION: Performed by: NURSE ANESTHETIST, CERTIFIED REGISTERED

## 2025-08-11 PROCEDURE — 25010000002 PROPOFOL 10 MG/ML EMULSION: Performed by: NURSE ANESTHETIST, CERTIFIED REGISTERED

## 2025-08-11 PROCEDURE — 88305 TISSUE EXAM BY PATHOLOGIST: CPT | Performed by: SURGERY

## 2025-08-11 PROCEDURE — 25810000003 LACTATED RINGERS PER 1000 ML: Performed by: NURSE ANESTHETIST, CERTIFIED REGISTERED

## 2025-08-11 RX ORDER — ONDANSETRON 4 MG/1
4 TABLET, ORALLY DISINTEGRATING ORAL ONCE AS NEEDED
Status: DISCONTINUED | OUTPATIENT
Start: 2025-08-11 | End: 2025-08-11 | Stop reason: HOSPADM

## 2025-08-11 RX ORDER — LIDOCAINE HYDROCHLORIDE 20 MG/ML
INJECTION, SOLUTION EPIDURAL; INFILTRATION; INTRACAUDAL; PERINEURAL AS NEEDED
Status: DISCONTINUED | OUTPATIENT
Start: 2025-08-11 | End: 2025-08-11 | Stop reason: SURG

## 2025-08-11 RX ORDER — PROPOFOL 10 MG/ML
VIAL (ML) INTRAVENOUS AS NEEDED
Status: DISCONTINUED | OUTPATIENT
Start: 2025-08-11 | End: 2025-08-11 | Stop reason: SURG

## 2025-08-11 RX ORDER — ONDANSETRON 2 MG/ML
4 INJECTION INTRAMUSCULAR; INTRAVENOUS ONCE AS NEEDED
Status: DISCONTINUED | OUTPATIENT
Start: 2025-08-11 | End: 2025-08-11 | Stop reason: HOSPADM

## 2025-08-11 RX ORDER — SODIUM CHLORIDE, SODIUM LACTATE, POTASSIUM CHLORIDE, CALCIUM CHLORIDE 600; 310; 30; 20 MG/100ML; MG/100ML; MG/100ML; MG/100ML
INJECTION, SOLUTION INTRAVENOUS CONTINUOUS PRN
Status: DISCONTINUED | OUTPATIENT
Start: 2025-08-11 | End: 2025-08-11 | Stop reason: SURG

## 2025-08-11 RX ADMIN — SODIUM CHLORIDE, POTASSIUM CHLORIDE, SODIUM LACTATE AND CALCIUM CHLORIDE: 600; 310; 30; 20 INJECTION, SOLUTION INTRAVENOUS at 09:40

## 2025-08-11 RX ADMIN — LIDOCAINE HYDROCHLORIDE 40 MG: 20 INJECTION, SOLUTION EPIDURAL; INFILTRATION; INTRACAUDAL; PERINEURAL at 09:43

## 2025-08-11 RX ADMIN — PROPOFOL 200 MCG/KG/MIN: 10 INJECTION, EMULSION INTRAVENOUS at 09:43

## 2025-08-11 RX ADMIN — PROPOFOL 50 MG: 10 INJECTION, EMULSION INTRAVENOUS at 09:43

## 2025-08-12 LAB
CYTO UR: NORMAL
LAB AP CASE REPORT: NORMAL
LAB AP CLINICAL INFORMATION: NORMAL
PATH REPORT.FINAL DX SPEC: NORMAL
PATH REPORT.GROSS SPEC: NORMAL

## (undated) DEVICE — GOWN,SIRUS,POLYRNF,BRTHSLV,2XL,18/CS: Brand: MEDLINE

## (undated) DEVICE — SOL IRRG H2O PL/BG 1000ML STRL

## (undated) DEVICE — TOWEL,OR,DSP,ST,BLUE,STD,4/PK,20PK/CS: Brand: MEDLINE

## (undated) DEVICE — SINGLE-USE BIOPSY FORCEPS: Brand: RADIAL JAW 4

## (undated) DEVICE — STERILE POLYISOPRENE POWDER-FREE SURGICAL GLOVES WITH EMOLLIENT COATING: Brand: PROTEXIS

## (undated) DEVICE — CONN JET HYDRA H20 AUXILIARY DISP

## (undated) DEVICE — LINER SURG CANSTR SXN S/RIGD 1500CC

## (undated) DEVICE — MAX-CORE® DISPOSABLE CORE BIOPSY INSTRUMENT, 18G X 20CM: Brand: MAX-CORE

## (undated) DEVICE — SOLIDIFIER LIQLOC PLS 1500CC BT

## (undated) DEVICE — GLV SURG SENSICARE PI ORTHO SZ8 LF STRL

## (undated) DEVICE — SOL IRR NACL 0.9PCT BO 1000ML

## (undated) DEVICE — DEFENDO AIR WATER SUCTION AND BIOPSY VALVE KIT: Brand: DEFENDO AIR/WATER/SUCTION AND BIOPSY VALVE

## (undated) DEVICE — Device

## (undated) DEVICE — MARKER,SKIN,WI/RULER AND LABELS: Brand: MEDLINE

## (undated) DEVICE — SHEET,DRAPE,70X85,STERILE: Brand: MEDLINE

## (undated) DEVICE — DRSNG TELFA PAD NONADH STR 1S 3X4IN

## (undated) DEVICE — GLOVE,SURG,SENSICARE SLT,LF,PF,6.5: Brand: MEDLINE